# Patient Record
Sex: FEMALE | Race: OTHER | NOT HISPANIC OR LATINO | URBAN - METROPOLITAN AREA
[De-identification: names, ages, dates, MRNs, and addresses within clinical notes are randomized per-mention and may not be internally consistent; named-entity substitution may affect disease eponyms.]

---

## 2021-09-22 ENCOUNTER — INPATIENT (INPATIENT)
Facility: HOSPITAL | Age: 66
LOS: 2 days | Discharge: HOME | DRG: 286 | End: 2021-09-25
Attending: HOSPITALIST | Admitting: HOSPITALIST
Payer: MEDICARE

## 2021-09-22 VITALS
HEIGHT: 66 IN | WEIGHT: 193.12 LBS | SYSTOLIC BLOOD PRESSURE: 173 MMHG | TEMPERATURE: 99 F | RESPIRATION RATE: 16 BRPM | OXYGEN SATURATION: 96 % | DIASTOLIC BLOOD PRESSURE: 86 MMHG | HEART RATE: 68 BPM

## 2021-09-22 LAB
ALBUMIN SERPL ELPH-MCNC: 4.2 G/DL — SIGNIFICANT CHANGE UP (ref 3.3–5)
ALP SERPL-CCNC: 238 U/L — HIGH (ref 40–120)
ALT FLD-CCNC: 11 U/L — SIGNIFICANT CHANGE UP (ref 10–45)
ANION GAP SERPL CALC-SCNC: 13 MMOL/L — SIGNIFICANT CHANGE UP (ref 5–17)
AST SERPL-CCNC: 24 U/L — SIGNIFICANT CHANGE UP (ref 10–40)
BASOPHILS # BLD AUTO: 0.04 K/UL — SIGNIFICANT CHANGE UP (ref 0–0.2)
BASOPHILS NFR BLD AUTO: 0.6 % — SIGNIFICANT CHANGE UP (ref 0–2)
BILIRUB SERPL-MCNC: 0.4 MG/DL — SIGNIFICANT CHANGE UP (ref 0.2–1.2)
BUN SERPL-MCNC: 17 MG/DL — SIGNIFICANT CHANGE UP (ref 7–23)
CALCIUM SERPL-MCNC: 9.4 MG/DL — SIGNIFICANT CHANGE UP (ref 8.4–10.5)
CHLORIDE SERPL-SCNC: 98 MMOL/L — SIGNIFICANT CHANGE UP (ref 96–108)
CK MB CFR SERPL CALC: 1.4 NG/ML — SIGNIFICANT CHANGE UP (ref 0–6.7)
CK SERPL-CCNC: 54 U/L — SIGNIFICANT CHANGE UP (ref 25–170)
CO2 SERPL-SCNC: 28 MMOL/L — SIGNIFICANT CHANGE UP (ref 22–31)
CREAT SERPL-MCNC: 4.04 MG/DL — HIGH (ref 0.5–1.3)
EOSINOPHIL # BLD AUTO: 0.16 K/UL — SIGNIFICANT CHANGE UP (ref 0–0.5)
EOSINOPHIL NFR BLD AUTO: 2.4 % — SIGNIFICANT CHANGE UP (ref 0–6)
GLUCOSE SERPL-MCNC: 91 MG/DL — SIGNIFICANT CHANGE UP (ref 70–99)
HCT VFR BLD CALC: 30.7 % — LOW (ref 34.5–45)
HGB BLD-MCNC: 10 G/DL — LOW (ref 11.5–15.5)
IMM GRANULOCYTES NFR BLD AUTO: 0.4 % — SIGNIFICANT CHANGE UP (ref 0–1.5)
LIDOCAIN IGE QN: 41 U/L — SIGNIFICANT CHANGE UP (ref 7–60)
LYMPHOCYTES # BLD AUTO: 1.26 K/UL — SIGNIFICANT CHANGE UP (ref 1–3.3)
LYMPHOCYTES # BLD AUTO: 18.9 % — SIGNIFICANT CHANGE UP (ref 13–44)
MAGNESIUM SERPL-MCNC: 1.9 MG/DL — SIGNIFICANT CHANGE UP (ref 1.6–2.6)
MCHC RBC-ENTMCNC: 30 PG — SIGNIFICANT CHANGE UP (ref 27–34)
MCHC RBC-ENTMCNC: 32.6 GM/DL — SIGNIFICANT CHANGE UP (ref 32–36)
MCV RBC AUTO: 92.2 FL — SIGNIFICANT CHANGE UP (ref 80–100)
MONOCYTES # BLD AUTO: 0.71 K/UL — SIGNIFICANT CHANGE UP (ref 0–0.9)
MONOCYTES NFR BLD AUTO: 10.6 % — SIGNIFICANT CHANGE UP (ref 2–14)
NEUTROPHILS # BLD AUTO: 4.48 K/UL — SIGNIFICANT CHANGE UP (ref 1.8–7.4)
NEUTROPHILS NFR BLD AUTO: 67.1 % — SIGNIFICANT CHANGE UP (ref 43–77)
NRBC # BLD: 0 /100 WBCS — SIGNIFICANT CHANGE UP (ref 0–0)
NT-PROBNP SERPL-SCNC: HIGH PG/ML (ref 0–300)
PHOSPHATE SERPL-MCNC: 3 MG/DL — SIGNIFICANT CHANGE UP (ref 2.5–4.5)
PLATELET # BLD AUTO: 177 K/UL — SIGNIFICANT CHANGE UP (ref 150–400)
POTASSIUM SERPL-MCNC: 3.5 MMOL/L — SIGNIFICANT CHANGE UP (ref 3.5–5.3)
POTASSIUM SERPL-SCNC: 3.5 MMOL/L — SIGNIFICANT CHANGE UP (ref 3.5–5.3)
PROT SERPL-MCNC: 7.8 G/DL — SIGNIFICANT CHANGE UP (ref 6–8.3)
RBC # BLD: 3.33 M/UL — LOW (ref 3.8–5.2)
RBC # FLD: 18 % — HIGH (ref 10.3–14.5)
SARS-COV-2 RNA SPEC QL NAA+PROBE: NEGATIVE — SIGNIFICANT CHANGE UP
SODIUM SERPL-SCNC: 139 MMOL/L — SIGNIFICANT CHANGE UP (ref 135–145)
TROPONIN T SERPL-MCNC: 0.08 NG/ML — CRITICAL HIGH (ref 0–0.01)
WBC # BLD: 6.68 K/UL — SIGNIFICANT CHANGE UP (ref 3.8–10.5)
WBC # FLD AUTO: 6.68 K/UL — SIGNIFICANT CHANGE UP (ref 3.8–10.5)

## 2021-09-22 PROCEDURE — 71046 X-RAY EXAM CHEST 2 VIEWS: CPT | Mod: 26

## 2021-09-22 PROCEDURE — 76705 ECHO EXAM OF ABDOMEN: CPT | Mod: 26

## 2021-09-22 PROCEDURE — 99223 1ST HOSP IP/OBS HIGH 75: CPT

## 2021-09-22 PROCEDURE — 93010 ELECTROCARDIOGRAM REPORT: CPT

## 2021-09-22 PROCEDURE — 99285 EMERGENCY DEPT VISIT HI MDM: CPT

## 2021-09-22 RX ORDER — MORPHINE SULFATE 50 MG/1
2 CAPSULE, EXTENDED RELEASE ORAL ONCE
Refills: 0 | Status: DISCONTINUED | OUTPATIENT
Start: 2021-09-22 | End: 2021-09-22

## 2021-09-22 RX ADMIN — MORPHINE SULFATE 2 MILLIGRAM(S): 50 CAPSULE, EXTENDED RELEASE ORAL at 23:37

## 2021-09-22 NOTE — ED ADULT NURSE NOTE - OBJECTIVE STATEMENT
66y female c/o intermittent R sided CP since Monday. pt sent in from dialysis center after receiving 3 of 3hr 50min session today. CP described as pressure and radiating to R side of back. hx of HTN, HLD, gout. pt gets dialysis on MWF. Patient is awake and alert. Alert and oriented x 4. speaking in clear, full sentences. Respirations even and unlabored. pt denies fever, chills, n/v/d, CP, headache, dizziness, numbness, tingling, blurry vision.

## 2021-09-22 NOTE — ED PROVIDER NOTE - CLINICAL SUMMARY MEDICAL DECISION MAKING FREE TEXT BOX
episodic pain right scapular region to chest over past week, today relieved by oxygen.  hx HTN ESRD, last provocative testing years ago.  IV contrast allergy. HD stable, pain-free at time of exam.  pain not reproducible.  EKG with T wave abnl, fairly similar to 2013 episodic pain right scapular region to chest over past week, today relieved by oxygen.  hx HTN ESRD, last provocative testing years ago.  IV contrast allergy. HD stable, pain-free at time of exam.  pain not reproducible.  EKG with T wave abnl, fairly similar to 2013.  No abd pain; not worse post-prandial.  Mild alk phos elevation, will get RUQ US to ensure not biliary colic.

## 2021-09-22 NOTE — ED PROVIDER NOTE - NS ED ROS FT
CONSTITUTIONAL: No fever, chills, or weakness  NEURO: No headache, no dizziness, no syncope; No focal weakness/tingling/numbness  EYES: No visual changes  ENT: No rhinorrhea or sore throat  PULM: No cough or dyspnea  CV: HPI  GI: No abdominal pain, vomiting, or diarrhea  : No dysuria, hematuria, frequency  MSK: No neck pain or back pain.   Gets sore knees, chronic.  No lower ext edema  SKIN: no rash or unusual bruising

## 2021-09-22 NOTE — ED ADULT NURSE REASSESSMENT NOTE - NS ED NURSE REASSESS COMMENT FT1
pt back from US. BP noted to be elevated. VS updated as per flow sheet. pt complaining of CP. BUFFY Sapp made aware. 2mg morphine ordered.

## 2021-09-22 NOTE — ED PROVIDER NOTE - OBJECTIVE STATEMENT
65 yo fem pMH HTN, ESRD HD MWF, gout  c/o pain in right scapular region radiating right anterior chest; intermittent episodes over past week, more frequent at night.  Described "like gas" pain.  Not affected by exertion, movement, position, or breathing. No SOB or palpitations, no cough.  No ESCOBAR.  The pain has been keeping her up at night for hours, cannot get comfortable. Today had the pain during dialysis, and her session was terminated a little early due to the pain.  She was given oxygen and the pain resolved a few minutes later.  No hx of CAD/CHF or diabetes.  Last stress test apx 10 yrs ago, unknown result.  Family hx mother had angina, onset ~50s.  Pt smokes cigarettes.  Used cocaine many years ago. 67 yo fem pMH HTN, ESRD HD MWF, gout  c/o pain in right scapular region radiating right anterior chest; intermittent episodes over past week, more frequent at night.  Described "like gas" pain.  Not affected by exertion, movement, position, or breathing. No SOB or palpitations, no cough.  No ESCOBAR.  The pain has been keeping her up at night for hours, cannot get comfortable. Today had the pain during dialysis, and her session was terminated a little early due to the pain.  She was given oxygen and the pain resolved a few minutes later.  EMS transported to ED, pain-free at time of exam.     No hx of CAD/CHF or diabetes.  Last stress test apx 10 yrs ago, unknown result.    Family hx mother had angina, onset ~50s.    Pt smokes cigarettes.  Used cocaine many years ago.  Vaccinated for Covid.

## 2021-09-22 NOTE — ED PROVIDER NOTE - PHYSICAL EXAMINATION
CONSTITUTIONAL: NAD   SKIN: Normal color and turgor.    HEAD: NC/AT.  EYES: Conjunctiva clear. EOMI. PERRL.    ENT: Airway clear. Normal voice.   RESPIRATORY:  Normal work of breathing. Lungs essentially clear, trace crackles at bases bilat. + JVD.  CARDIOVASCULAR:  RRR, S1S2. No M/R/G.  Good thrill left AC.   GI:  Abdomen soft, nontender.    MSK: Neck supple.  No lower extremity edema or calf tenderness.  No joint swelling or ROM limitation.  NEURO: Alert and oriented; CN II-XII grossly intact. Speech clear. 5/5 strength in all extremities.  Good balance. Steady gait.

## 2021-09-22 NOTE — ED ADULT TRIAGE NOTE - CHIEF COMPLAINT QUOTE
Pt reports intermittent chest pain since Monday, reports no pain at this time. Pt gets dialysis MWF, had dialysis on Monday, and completed 3 hours of 3 hours and 50min today. Pt denies sob, n/v, dizziness, weakness, cough, fevers.

## 2021-09-22 NOTE — ED ADULT NURSE REASSESSMENT NOTE - NS ED NURSE REASSESS COMMENT FT1
as per BUFFY Sapp, if US is negative pt will be admitted to cards. bed ready on 5lach. waiting for US to result. pt currently @ US. will return to ED when finished.

## 2021-09-23 DIAGNOSIS — D64.9 ANEMIA, UNSPECIFIED: ICD-10-CM

## 2021-09-23 DIAGNOSIS — I10 ESSENTIAL (PRIMARY) HYPERTENSION: ICD-10-CM

## 2021-09-23 DIAGNOSIS — R74.8 ABNORMAL LEVELS OF OTHER SERUM ENZYMES: ICD-10-CM

## 2021-09-23 DIAGNOSIS — Z29.9 ENCOUNTER FOR PROPHYLACTIC MEASURES, UNSPECIFIED: ICD-10-CM

## 2021-09-23 DIAGNOSIS — I24.9 ACUTE ISCHEMIC HEART DISEASE, UNSPECIFIED: ICD-10-CM

## 2021-09-23 DIAGNOSIS — R07.9 CHEST PAIN, UNSPECIFIED: ICD-10-CM

## 2021-09-23 DIAGNOSIS — N18.6 END STAGE RENAL DISEASE: ICD-10-CM

## 2021-09-23 LAB
A1C WITH ESTIMATED AVERAGE GLUCOSE RESULT: 4.4 % — SIGNIFICANT CHANGE UP (ref 4–5.6)
ANION GAP SERPL CALC-SCNC: 14 MMOL/L — SIGNIFICANT CHANGE UP (ref 5–17)
APTT BLD: 33.7 SEC — SIGNIFICANT CHANGE UP (ref 27.5–35.5)
BUN SERPL-MCNC: 24 MG/DL — HIGH (ref 7–23)
CALCIUM SERPL-MCNC: 9.8 MG/DL — SIGNIFICANT CHANGE UP (ref 8.4–10.5)
CHLORIDE SERPL-SCNC: 98 MMOL/L — SIGNIFICANT CHANGE UP (ref 96–108)
CHOLEST SERPL-MCNC: 212 MG/DL — HIGH
CK MB CFR SERPL CALC: 1.2 NG/ML — SIGNIFICANT CHANGE UP (ref 0–6.7)
CK MB CFR SERPL CALC: 1.3 NG/ML — SIGNIFICANT CHANGE UP (ref 0–6.7)
CK SERPL-CCNC: 41 U/L — SIGNIFICANT CHANGE UP (ref 25–170)
CK SERPL-CCNC: 49 U/L — SIGNIFICANT CHANGE UP (ref 25–170)
CO2 SERPL-SCNC: 28 MMOL/L — SIGNIFICANT CHANGE UP (ref 22–31)
COVID-19 SPIKE DOMAIN AB INTERP: POSITIVE
COVID-19 SPIKE DOMAIN ANTIBODY RESULT: >250 U/ML — HIGH
CREAT SERPL-MCNC: 6.05 MG/DL — HIGH (ref 0.5–1.3)
ESTIMATED AVERAGE GLUCOSE: 80 MG/DL — SIGNIFICANT CHANGE UP (ref 68–114)
FERRITIN SERPL-MCNC: 1475 NG/ML — HIGH (ref 15–150)
FOLATE SERPL-MCNC: >20 NG/ML — SIGNIFICANT CHANGE UP
GGT SERPL-CCNC: 12 U/L — SIGNIFICANT CHANGE UP (ref 8–40)
GLUCOSE SERPL-MCNC: 83 MG/DL — SIGNIFICANT CHANGE UP (ref 70–99)
HBV SURFACE AB SER-ACNC: REACTIVE
HBV SURFACE AG SER-ACNC: SIGNIFICANT CHANGE UP
HCT VFR BLD CALC: 29.1 % — LOW (ref 34.5–45)
HCV AB S/CO SERPL IA: 0.05 S/CO — SIGNIFICANT CHANGE UP
HCV AB SERPL-IMP: SIGNIFICANT CHANGE UP
HDLC SERPL-MCNC: 81 MG/DL — SIGNIFICANT CHANGE UP
HGB BLD-MCNC: 9.3 G/DL — LOW (ref 11.5–15.5)
IRON SATN MFR SERPL: 41 % — SIGNIFICANT CHANGE UP (ref 14–50)
IRON SATN MFR SERPL: 57 UG/DL — SIGNIFICANT CHANGE UP (ref 30–160)
LIPID PNL WITH DIRECT LDL SERPL: 107 MG/DL — HIGH
MAGNESIUM SERPL-MCNC: 1.8 MG/DL — SIGNIFICANT CHANGE UP (ref 1.6–2.6)
MCHC RBC-ENTMCNC: 29.9 PG — SIGNIFICANT CHANGE UP (ref 27–34)
MCHC RBC-ENTMCNC: 32 GM/DL — SIGNIFICANT CHANGE UP (ref 32–36)
MCV RBC AUTO: 93.6 FL — SIGNIFICANT CHANGE UP (ref 80–100)
NON HDL CHOLESTEROL: 131 MG/DL — HIGH
NRBC # BLD: 0 /100 WBCS — SIGNIFICANT CHANGE UP (ref 0–0)
PLATELET # BLD AUTO: 171 K/UL — SIGNIFICANT CHANGE UP (ref 150–400)
POTASSIUM SERPL-MCNC: 3.7 MMOL/L — SIGNIFICANT CHANGE UP (ref 3.5–5.3)
POTASSIUM SERPL-SCNC: 3.7 MMOL/L — SIGNIFICANT CHANGE UP (ref 3.5–5.3)
RBC # BLD: 3.11 M/UL — LOW (ref 3.8–5.2)
RBC # FLD: 18 % — HIGH (ref 10.3–14.5)
SARS-COV-2 IGG+IGM SERPL QL IA: >250 U/ML — HIGH
SARS-COV-2 IGG+IGM SERPL QL IA: POSITIVE
SODIUM SERPL-SCNC: 140 MMOL/L — SIGNIFICANT CHANGE UP (ref 135–145)
TIBC SERPL-MCNC: 140 UG/DL — LOW (ref 220–430)
TRANSFERRIN SERPL-MCNC: 126 MG/DL — LOW (ref 200–360)
TRIGL SERPL-MCNC: 120 MG/DL — SIGNIFICANT CHANGE UP
TROPONIN T SERPL-MCNC: 0.08 NG/ML — CRITICAL HIGH (ref 0–0.01)
TROPONIN T SERPL-MCNC: 0.08 NG/ML — CRITICAL HIGH (ref 0–0.01)
UIBC SERPL-MCNC: 83 UG/DL — LOW (ref 110–370)
VIT B12 SERPL-MCNC: 686 PG/ML — SIGNIFICANT CHANGE UP (ref 232–1245)
WBC # BLD: 5.7 K/UL — SIGNIFICANT CHANGE UP (ref 3.8–10.5)
WBC # FLD AUTO: 5.7 K/UL — SIGNIFICANT CHANGE UP (ref 3.8–10.5)

## 2021-09-23 PROCEDURE — 93306 TTE W/DOPPLER COMPLETE: CPT | Mod: 26

## 2021-09-23 PROCEDURE — 78452 HT MUSCLE IMAGE SPECT MULT: CPT | Mod: 26

## 2021-09-23 PROCEDURE — 99232 SBSQ HOSP IP/OBS MODERATE 35: CPT | Mod: GC

## 2021-09-23 PROCEDURE — 99233 SBSQ HOSP IP/OBS HIGH 50: CPT

## 2021-09-23 PROCEDURE — 93018 CV STRESS TEST I&R ONLY: CPT

## 2021-09-23 PROCEDURE — 93016 CV STRESS TEST SUPVJ ONLY: CPT

## 2021-09-23 RX ORDER — AMLODIPINE BESYLATE 2.5 MG/1
2.5 TABLET ORAL EVERY 24 HOURS
Refills: 0 | Status: DISCONTINUED | OUTPATIENT
Start: 2021-09-23 | End: 2021-09-23

## 2021-09-23 RX ORDER — CINACALCET 30 MG/1
1 TABLET, FILM COATED ORAL
Qty: 0 | Refills: 0 | DISCHARGE

## 2021-09-23 RX ORDER — SEVELAMER CARBONATE 2400 MG/1
800 POWDER, FOR SUSPENSION ORAL
Refills: 0 | Status: DISCONTINUED | OUTPATIENT
Start: 2021-09-23 | End: 2021-09-25

## 2021-09-23 RX ORDER — SUCROFERRIC OXYHYDROXIDE 500 MG/1
1 TABLET, CHEWABLE ORAL
Qty: 0 | Refills: 0 | DISCHARGE

## 2021-09-23 RX ORDER — CINACALCET 30 MG/1
30 TABLET, FILM COATED ORAL DAILY
Refills: 0 | Status: DISCONTINUED | OUTPATIENT
Start: 2021-09-23 | End: 2021-09-25

## 2021-09-23 RX ORDER — AMLODIPINE BESYLATE 2.5 MG/1
5 TABLET ORAL EVERY 24 HOURS
Refills: 0 | Status: DISCONTINUED | OUTPATIENT
Start: 2021-09-23 | End: 2021-09-23

## 2021-09-23 RX ORDER — AMLODIPINE BESYLATE 2.5 MG/1
10 TABLET ORAL DAILY
Refills: 0 | Status: DISCONTINUED | OUTPATIENT
Start: 2021-09-23 | End: 2021-09-24

## 2021-09-23 RX ORDER — HEPARIN SODIUM 5000 [USP'U]/ML
5000 INJECTION INTRAVENOUS; SUBCUTANEOUS EVERY 8 HOURS
Refills: 0 | Status: DISCONTINUED | OUTPATIENT
Start: 2021-09-23 | End: 2021-09-25

## 2021-09-23 RX ORDER — CARVEDILOL PHOSPHATE 80 MG/1
25 CAPSULE, EXTENDED RELEASE ORAL EVERY 12 HOURS
Refills: 0 | Status: DISCONTINUED | OUTPATIENT
Start: 2021-09-23 | End: 2021-09-25

## 2021-09-23 RX ORDER — SEVELAMER CARBONATE 2400 MG/1
1 POWDER, FOR SUSPENSION ORAL
Qty: 0 | Refills: 0 | DISCHARGE

## 2021-09-23 RX ORDER — ETELCALCETIDE 5 MG/ML
2.5 INJECTION, SOLUTION INTRAVENOUS
Qty: 0 | Refills: 0 | DISCHARGE

## 2021-09-23 RX ORDER — ASPIRIN/CALCIUM CARB/MAGNESIUM 324 MG
81 TABLET ORAL DAILY
Refills: 0 | Status: DISCONTINUED | OUTPATIENT
Start: 2021-09-24 | End: 2021-09-25

## 2021-09-23 RX ORDER — CARVEDILOL PHOSPHATE 80 MG/1
25 CAPSULE, EXTENDED RELEASE ORAL ONCE
Refills: 0 | Status: COMPLETED | OUTPATIENT
Start: 2021-09-23 | End: 2021-09-23

## 2021-09-23 RX ORDER — ALLOPURINOL 300 MG
100 TABLET ORAL EVERY 24 HOURS
Refills: 0 | Status: DISCONTINUED | OUTPATIENT
Start: 2021-09-23 | End: 2021-09-25

## 2021-09-23 RX ORDER — ACETAMINOPHEN 500 MG
650 TABLET ORAL EVERY 6 HOURS
Refills: 0 | Status: DISCONTINUED | OUTPATIENT
Start: 2021-09-23 | End: 2021-09-25

## 2021-09-23 RX ORDER — INFLUENZA VIRUS VACCINE 15; 15; 15; 15 UG/.5ML; UG/.5ML; UG/.5ML; UG/.5ML
0.5 SUSPENSION INTRAMUSCULAR ONCE
Refills: 0 | Status: DISCONTINUED | OUTPATIENT
Start: 2021-09-23 | End: 2021-09-23

## 2021-09-23 RX ORDER — ATORVASTATIN CALCIUM 80 MG/1
40 TABLET, FILM COATED ORAL AT BEDTIME
Refills: 0 | Status: DISCONTINUED | OUTPATIENT
Start: 2021-09-23 | End: 2021-09-25

## 2021-09-23 RX ORDER — ASPIRIN/CALCIUM CARB/MAGNESIUM 324 MG
81 TABLET ORAL ONCE
Refills: 0 | Status: COMPLETED | OUTPATIENT
Start: 2021-09-23 | End: 2021-09-23

## 2021-09-23 RX ORDER — AMLODIPINE BESYLATE 2.5 MG/1
7.5 TABLET ORAL ONCE
Refills: 0 | Status: COMPLETED | OUTPATIENT
Start: 2021-09-23 | End: 2021-09-23

## 2021-09-23 RX ORDER — INFLUENZA VIRUS VACCINE 15; 15; 15; 15 UG/.5ML; UG/.5ML; UG/.5ML; UG/.5ML
0.7 SUSPENSION INTRAMUSCULAR ONCE
Refills: 0 | Status: COMPLETED | OUTPATIENT
Start: 2021-09-23 | End: 2021-09-25

## 2021-09-23 RX ORDER — HYDRALAZINE HCL 50 MG
10 TABLET ORAL ONCE
Refills: 0 | Status: COMPLETED | OUTPATIENT
Start: 2021-09-23 | End: 2021-09-23

## 2021-09-23 RX ADMIN — SEVELAMER CARBONATE 800 MILLIGRAM(S): 2400 POWDER, FOR SUSPENSION ORAL at 18:04

## 2021-09-23 RX ADMIN — Medication 10 MILLIGRAM(S): at 11:13

## 2021-09-23 RX ADMIN — AMLODIPINE BESYLATE 7.5 MILLIGRAM(S): 2.5 TABLET ORAL at 11:13

## 2021-09-23 RX ADMIN — CINACALCET 30 MILLIGRAM(S): 30 TABLET, FILM COATED ORAL at 18:04

## 2021-09-23 RX ADMIN — CARVEDILOL PHOSPHATE 25 MILLIGRAM(S): 80 CAPSULE, EXTENDED RELEASE ORAL at 00:50

## 2021-09-23 RX ADMIN — CARVEDILOL PHOSPHATE 25 MILLIGRAM(S): 80 CAPSULE, EXTENDED RELEASE ORAL at 21:01

## 2021-09-23 RX ADMIN — AMLODIPINE BESYLATE 2.5 MILLIGRAM(S): 2.5 TABLET ORAL at 05:37

## 2021-09-23 RX ADMIN — CARVEDILOL PHOSPHATE 25 MILLIGRAM(S): 80 CAPSULE, EXTENDED RELEASE ORAL at 08:32

## 2021-09-23 RX ADMIN — Medication 1 TABLET(S): at 18:04

## 2021-09-23 RX ADMIN — HEPARIN SODIUM 5000 UNIT(S): 5000 INJECTION INTRAVENOUS; SUBCUTANEOUS at 05:37

## 2021-09-23 RX ADMIN — MORPHINE SULFATE 2 MILLIGRAM(S): 50 CAPSULE, EXTENDED RELEASE ORAL at 00:52

## 2021-09-23 RX ADMIN — Medication 81 MILLIGRAM(S): at 11:13

## 2021-09-23 RX ADMIN — Medication 100 MILLIGRAM(S): at 05:39

## 2021-09-23 RX ADMIN — ATORVASTATIN CALCIUM 40 MILLIGRAM(S): 80 TABLET, FILM COATED ORAL at 21:02

## 2021-09-23 NOTE — PROGRESS NOTE ADULT - ASSESSMENT
65 yo F with past medical history of HTN, ESRD HD MWF, gout presents from dialysis clinic complaining of chest pain. Found to have elevated trop and BNP, admitted to cardiac telemetry for ischemic work up and r/o ACS

## 2021-09-23 NOTE — PROGRESS NOTE ADULT - PROBLEM SELECTOR PLAN 2
History of hypertension. Presents with elevated blood pressure after dialysis. SBP up to 210s on arrival to floor on admission, likely in setting of missed medication dose, asymptomatic. Patient states that she has a long history of hypertension, managed by her nephrologist Dr. Arellano.  - Patient states home blood pressures remain elevated 150-160s on dialysis days and 170s-180s on non dialysis days. Will aim for 24 hour goal back to 160s systolic blood pressure  - c/w home med carvedilol 25mg q12h  - c/w home med amlodipine 2.5mg  - Consider uptitration of amlodipine  - Collateral medical history from Dr. Arellano on blood pressure regimen History of hypertension. Patient presented to ED with SBP up to 210s on arrival to floor on admission, likely in setting of missed medication dose, asymptomatic. SBP in 200s 9/23 AM --Patient states home blood pressures remain elevated 150-160s on dialysis days and 170s-180s on non dialysis days.   - c/w home med carvedilol 25mg q12h  - Increase home med amlodipine 2.5 to 10mg  - Collateral medical history from Dr. Arellano on blood pressure regimen -known history of hypertension, follows with nephrologist Dr. Arellano  -home medications: amlodipine 2.5mg qd, coreg 25mg bid  -Patient presented to ED with SBP up to 210s on arrival to floor on admission, likely in setting of missed medication dose, asymptomatic. SBP in 200s 9/23 AM --Patient states home blood pressures remain elevated 150-160s on dialysis days and 170s-180s on non dialysis days.   Plan:    -c/w home med carvedilol 25mg q12h    -Increase home med amlodipine 2.5 to 10mg qd    -Collateral medical history from Dr. Arellano on blood pressure regimen

## 2021-09-23 NOTE — H&P ADULT - PROBLEM SELECTOR PLAN 6
F: None  E: Repletion per renal, pt on dialysis  N: NPO at midnight, DASH    DVT PPx: Subq hep 5000 U q8h  GI PPx: none    Dispo: Cardiac Telemetry

## 2021-09-23 NOTE — PROGRESS NOTE ADULT - SUBJECTIVE AND OBJECTIVE BOX
Patient was seen and evaluated on dialysis. patients BPs are better controlled than this AM; will adjust time to 3 hours instead of 4 hours    Dialyzer: Optiflux [id number]  QB: 400 mL/min  QD: 500 mL/min  K bath: 3  Goal UF: 3L  Duration: 180 min    Patient is tolerating the procedure well.   Continue full hemodialysis treatment as prescribed. Patient was seen and evaluated on dialysis. patients BPs are still elevated patient tolerated stress test    Dialyzer: Optiflux [id number]  QB: 400 mL/min  QD: 500 mL/min  K bath: 3  Goal UF: 3L  Duration: 240 min    Patient is tolerating the procedure well.   Continue full hemodialysis treatment as prescribed. Patient was seen and evaluated on dialysis. patients BPs are still elevated patient tolerated stress test however improving;     Dialyzer: Optiflux [id number]  QB: 400 mL/min  QD: 500 mL/min  K bath: 3  Goal UF: 3L  Duration: 240 min    Patient is tolerating the procedure well.   Continue full hemodialysis treatment as prescribed.

## 2021-09-23 NOTE — H&P ADULT - PROBLEM SELECTOR PLAN 2
History of hypertension. Presents with elevated blood pressure after dialysis. Possibly 2/2 pain.  - c/w home medications History of hypertension. Presents with elevated blood pressure after dialysis. SBP up to 210s on arrival to floor on admission, likely in setting of missed medication dose, asymptomatic. Patient states that she has a long history of hypertension, managed by her nephrologist Dr. Arellano.  - Patient states home blood pressures remain elevated 150-160s on dialysis days and 170s-180s on non dialysis days  - c/w home med carvedilol 25mg q12h  - c/w home med amlodipine 2.5mg  - Consider uptitration of amlodipine  - Collateral medical history from Dr. Arellano on blood pressure regimen History of hypertension. Presents with elevated blood pressure after dialysis. SBP up to 210s on arrival to floor on admission, likely in setting of missed medication dose, asymptomatic. Patient states that she has a long history of hypertension, managed by her nephrologist Dr. Arellano.  - Patient states home blood pressures remain elevated 150-160s on dialysis days and 170s-180s on non dialysis days. Will aim for 24 hour goal back to 160s systolic blood pressure  - c/w home med carvedilol 25mg q12h  - c/w home med amlodipine 2.5mg  - Consider uptitration of amlodipine  - Collateral medical history from Dr. Arellano on blood pressure regimen

## 2021-09-23 NOTE — PROGRESS NOTE ADULT - PROBLEM SELECTOR PLAN 5
Hgb 10.0 (unknown baseline) likely 2/2 chronic disease given MCV 92. Has received venofer and EPO outpatient and Mircera  - f/u iron studies and ferritin in AM  - f/u folate and B12 Hgb 10.0 (unknown baseline) likely 2/2 chronic disease given ferritin 1475, TBIC 140, folate and B12 WNL. Has received Venofer and EPO outpatient and Mircera  Plan:  - Consider starting iron supplementation

## 2021-09-23 NOTE — H&P ADULT - HISTORY OF PRESENT ILLNESS
65 yo F with past medical history of HTN, ESRD HD MWF, gout presents from dialysis clinic complaining of right shoulder pain. Patient notes that the pain is in right shoulder region and radiates in the front as well when she has it. It has been on and off for the past week, intermittent episodes, more frequent at night. Described pain as "like gas pain", but more in her shoulder not epigastric, does eat late meals after 7pm, denies metallic taste. Not affected by exertion, movement, position, or breathing. No SOB or palpitations, no cough. No ESCOBAR. The pain has been keeping her up at night for hours, cannot get comfortable. Today had the pain during dialysis, and her session was terminated a little early due to the pain. She was given oxygen and the pain resolved a few minutes later. No hx of CAD/CHF or diabetes. Last stress test apx 10 yrs ago, unknown result. Family hx mother had angina, onset ~50s. Pt smokes cigarettes.  Used cocaine many years ago. Vaccinated for Covid.    ED Vitals: T 98.7, P 68, /86, RR 16, O2 96% on RA  ED Course:  - Labs: Hgb 10.0, Cr 4.04, Alk Phos 238, Trop 0.08, CKMB 1.4, sBNP >70,000  - Imaging: CXR with no acute cardiopulmonary disease process. Cardiomegaly.  - EKG prelim read: Sinus, normal axis, 1st degree heart block (), no ST elevations or depressions, T wave flattening in V5-V6, occasional PVC  - Interventions: Morphine 2mg

## 2021-09-23 NOTE — PROGRESS NOTE ADULT - SUBJECTIVE AND OBJECTIVE BOX
***Note in progress***    OVERNIGHT EVENTS: NAEO    SUBJECTIVE / INTERVAL HPI: Patient seen and examined at bedside. Patient denying chest pain, SOB, palpitations, cough. Patient denies fever, chills, HA, Dizziness, change in vision/hearing, N/V, abdominal pain, diarrhea, constipation, hematochezia/melena, dysuria, hematuria, new onset weakness/numbness, LE pain and/or swelling.    Remaining ROS negative       PHYSICAL EXAM:    General: WDWN  HEENT: NC/AT; PERRL, anicteric sclera; MMM  Neck: supple  Cardiovascular: +S1/S2, RRR  Respiratory: CTA B/L; no W/R/R  Gastrointestinal: soft, NT/ND; +BSx4  Extremities: WWP; no edema, clubbing or cyanosis  Vascular: 2+ radial, DP/PT pulses B/L  Neurological: AAOx3; no focal deficits  Psychiatric: pleasant mood and affect  Dermatologic: no appreciable wounds or damage to the skin    VITAL SIGNS:  Vital Signs Last 24 Hrs  T(C): 36.7 (23 Sep 2021 00:36), Max: 37.1 (22 Sep 2021 18:43)  T(F): 98 (23 Sep 2021 00:36), Max: 98.7 (22 Sep 2021 18:43)  HR: 75 (23 Sep 2021 04:00) (68 - 81)  BP: 186/76 (23 Sep 2021 04:00) (173/86 - 219/93)  BP(mean): 109 (23 Sep 2021 04:00) (109 - 143)  RR: 18 (23 Sep 2021 04:00) (16 - 20)  SpO2: 100% (23 Sep 2021 04:00) (96% - 100%)      MEDICATIONS:  MEDICATIONS  (STANDING):  allopurinol 100 milliGRAM(s) Oral every 24 hours  amLODIPine   Tablet 2.5 milliGRAM(s) Oral every 24 hours  carvedilol 25 milliGRAM(s) Oral every 12 hours  cinacalcet 30 milliGRAM(s) Oral daily  heparin   Injectable 5000 Unit(s) SubCutaneous every 8 hours  influenza  Vaccine (HIGH DOSE) 0.7 milliLiter(s) IntraMuscular once  Nephro-margaret 1 Tablet(s) Oral daily  sevelamer carbonate 800 milliGRAM(s) Oral three times a day with meals    MEDICATIONS  (PRN):  acetaminophen   Tablet .. 650 milliGRAM(s) Oral every 6 hours PRN Moderate Pain (4 - 6)      ALLERGIES:  Allergies    IV Contrast (Angioedema)  lisinopril (Angioedema)  penicillin (Unknown)    Intolerances        LABS:                        9.3    5.70  )-----------( 171      ( 23 Sep 2021 06:48 )             29.1     09-22    139  |  98  |  17  ----------------------------<  91  3.5   |  28  |  4.04<H>    Ca    9.4      22 Sep 2021 20:04  Phos  3.0     09-22  Mg     1.9     09-22    TPro  7.8  /  Alb  4.2  /  TBili  0.4  /  DBili  x   /  AST  24  /  ALT  11  /  AlkPhos  238<H>  09-22    PTT - ( 23 Sep 2021 06:48 )  PTT:33.7 sec    CAPILLARY BLOOD GLUCOSE          RADIOLOGY & ADDITIONAL TESTS: Reviewed. ***Note in progress***    OVERNIGHT EVENTS: NAEO    SUBJECTIVE / INTERVAL HPI: Patient seen and examined at bedside. Patient denying chest pain, SOB, palpitations, cough. Patient denies fever, chills, HA, Dizziness, change in vision/hearing, N/V, abdominal pain, diarrhea, constipation, hematochezia/melena, dysuria, hematuria, new onset weakness/numbness, LE pain and/or swelling.    Remaining ROS negative       PHYSICAL EXAM:    General: Pt resting comfortably in bed, in no acute distress  HEENT: NC/AT; PERRL, anicteric sclera; MMM  Neck: supple, no JVD presnt  Cardiovascular: +S1/S2, normal RRR, no murmurs gallops or rubs  Respiratory: CTA B/L; no W/R/R, no use of accessory muscles  Gastrointestinal: soft, NT/ND, no palpable masses or hepatosplenomegaly appreciated; no rebound or guarding, +BSx4  Extremities: WWP; no edema, clubbing or cyanosis  Vascular: 2+ radial, DP/PT pulses B/L  Neurological: AAOx3; no focal deficits  Psychiatric: pleasant mood and affect  Dermatologic: no appreciable wounds or damage to the skin    VITAL SIGNS:  Vital Signs Last 24 Hrs  T(C): 36.7 (23 Sep 2021 00:36), Max: 37.1 (22 Sep 2021 18:43)  T(F): 98 (23 Sep 2021 00:36), Max: 98.7 (22 Sep 2021 18:43)  HR: 75 (23 Sep 2021 04:00) (68 - 81)  BP: 186/76 (23 Sep 2021 04:00) (173/86 - 219/93)  BP(mean): 109 (23 Sep 2021 04:00) (109 - 143)  RR: 18 (23 Sep 2021 04:00) (16 - 20)  SpO2: 100% (23 Sep 2021 04:00) (96% - 100%)      MEDICATIONS:  MEDICATIONS  (STANDING):  allopurinol 100 milliGRAM(s) Oral every 24 hours  amLODIPine   Tablet 2.5 milliGRAM(s) Oral every 24 hours  carvedilol 25 milliGRAM(s) Oral every 12 hours  cinacalcet 30 milliGRAM(s) Oral daily  heparin   Injectable 5000 Unit(s) SubCutaneous every 8 hours  influenza  Vaccine (HIGH DOSE) 0.7 milliLiter(s) IntraMuscular once  Nephro-margaret 1 Tablet(s) Oral daily  sevelamer carbonate 800 milliGRAM(s) Oral three times a day with meals    MEDICATIONS  (PRN):  acetaminophen   Tablet .. 650 milliGRAM(s) Oral every 6 hours PRN Moderate Pain (4 - 6)      ALLERGIES:  Allergies    IV Contrast (Angioedema)  lisinopril (Angioedema)  penicillin (Unknown)    Intolerances        LABS:                        9.3    5.70  )-----------( 171      ( 23 Sep 2021 06:48 )             29.1     09-22    139  |  98  |  17  ----------------------------<  91  3.5   |  28  |  4.04<H>    Ca    9.4      22 Sep 2021 20:04  Phos  3.0     09-22  Mg     1.9     09-22    TPro  7.8  /  Alb  4.2  /  TBili  0.4  /  DBili  x   /  AST  24  /  ALT  11  /  AlkPhos  238<H>  09-22    PTT - ( 23 Sep 2021 06:48 )  PTT:33.7 sec    CAPILLARY BLOOD GLUCOSE          RADIOLOGY & ADDITIONAL TESTS: Reviewed.   OVERNIGHT EVENTS: NAEO    SUBJECTIVE / INTERVAL HPI: Patient seen and examined at bedside. Pt has no complaints at current. Patient denying chest pain, SOB, palpitations, cough. Patient denies fever, chills, HA, Dizziness, change in vision/hearing, N/V, abdominal pain, diarrhea, constipation, hematochezia/melena, dysuria, hematuria, new onset weakness/numbness, LE pain and/or swelling.    Remaining ROS negative       PHYSICAL EXAM:    General: Pt resting comfortably in bed, in no acute distress  HEENT: NC/AT; PERRL, anicteric sclera; MMM  Neck: supple, no JVD presnt  Cardiovascular: +S1/S2, normal RRR, no murmurs gallops or rubs  Respiratory: CTA B/L; no W/R/R, no use of accessory muscles  Gastrointestinal: soft, NT/ND, no palpable masses or hepatosplenomegaly appreciated; no rebound or guarding, +BSx4  Extremities: WWP; no edema, clubbing or cyanosis  Vascular: 2+ radial, DP/PT pulses B/L  Neurological: AAOx3; no focal deficits  Psychiatric: pleasant mood and affect  Dermatologic: no appreciable wounds or damage to the skin    VITAL SIGNS:  Vital Signs Last 24 Hrs  T(C): 36.7 (23 Sep 2021 00:36), Max: 37.1 (22 Sep 2021 18:43)  T(F): 98 (23 Sep 2021 00:36), Max: 98.7 (22 Sep 2021 18:43)  HR: 75 (23 Sep 2021 04:00) (68 - 81)  BP: 186/76 (23 Sep 2021 04:00) (173/86 - 219/93)  BP(mean): 109 (23 Sep 2021 04:00) (109 - 143)  RR: 18 (23 Sep 2021 04:00) (16 - 20)  SpO2: 100% (23 Sep 2021 04:00) (96% - 100%)      MEDICATIONS:  MEDICATIONS  (STANDING):  allopurinol 100 milliGRAM(s) Oral every 24 hours  amLODIPine   Tablet 2.5 milliGRAM(s) Oral every 24 hours  carvedilol 25 milliGRAM(s) Oral every 12 hours  cinacalcet 30 milliGRAM(s) Oral daily  heparin   Injectable 5000 Unit(s) SubCutaneous every 8 hours  influenza  Vaccine (HIGH DOSE) 0.7 milliLiter(s) IntraMuscular once  Nephro-margaret 1 Tablet(s) Oral daily  sevelamer carbonate 800 milliGRAM(s) Oral three times a day with meals    MEDICATIONS  (PRN):  acetaminophen   Tablet .. 650 milliGRAM(s) Oral every 6 hours PRN Moderate Pain (4 - 6)      ALLERGIES:  Allergies    IV Contrast (Angioedema)  lisinopril (Angioedema)  penicillin (Unknown)    Intolerances        LABS:                        9.3    5.70  )-----------( 171      ( 23 Sep 2021 06:48 )             29.1     09-22    139  |  98  |  17  ----------------------------<  91  3.5   |  28  |  4.04<H>    Ca    9.4      22 Sep 2021 20:04  Phos  3.0     09-22  Mg     1.9     09-22    TPro  7.8  /  Alb  4.2  /  TBili  0.4  /  DBili  x   /  AST  24  /  ALT  11  /  AlkPhos  238<H>  09-22    PTT - ( 23 Sep 2021 06:48 )  PTT:33.7 sec    CAPILLARY BLOOD GLUCOSE          RADIOLOGY & ADDITIONAL TESTS: Reviewed.

## 2021-09-23 NOTE — PROGRESS NOTE ADULT - PROBLEM SELECTOR PLAN 4
Elevated alk phos to 238. Patient states that it feels like gas pain  - f/u ED RUQ read  - f/u serum GGT  - Consider simethicone PRN Elevated alk phos to 238 on admission, GGT found to be WNL. Patient states that it feels like gas pain, however has no active pain 9/23 AM  RUQ US 9/22: 1. No acute sonographic findings; 5 mm non-obstructing right midpole intrarenal calculus.  Plan:  - Consider simethicone PRN

## 2021-09-23 NOTE — H&P ADULT - NSHPLABSRESULTS_GEN_ALL_CORE
.  LABS:                         10.0   6.68  )-----------( 177      ( 22 Sep 2021 20:04 )             30.7     09-22    139  |  98  |  17  ----------------------------<  91  3.5   |  28  |  4.04<H>    Ca    9.4      22 Sep 2021 20:04  Phos  3.0     09-22  Mg     1.9     09-22    TPro  7.8  /  Alb  4.2  /  TBili  0.4  /  DBili  x   /  AST  24  /  ALT  11  /  AlkPhos  238<H>  09-22        CARDIAC MARKERS ( 22 Sep 2021 20:04 )  x     / 0.08 ng/mL / 54 U/L / x     / 1.4 ng/mL      Serum Pro-Brain Natriuretic Peptide: >42906 pg/mL (09-22 @ 20:04)        RADIOLOGY, EKG & ADDITIONAL TESTS: Reviewed.

## 2021-09-23 NOTE — H&P ADULT - PROBLEM SELECTOR PLAN 5
Hgb 10.0 (unknown baseline) likely 2/2 chronic disease given MCV 92. Has received venofer and EPO outpatient and Mircera  - f/u iron studies and ferritin in AM  - f/u folate and B12

## 2021-09-23 NOTE — PROGRESS NOTE ADULT - PROBLEM SELECTOR PLAN 1
# r/o ACS  Presenting with right shoulder and right anterior chest pain. Intermittent, non reproducible. "relieved by oxygen" and morphine. Patient states it feels similar to epigastric "gas pain" she has had in the past. Currently no chest pain and asymptomatic.   EKG non ischemic on prelim read  Trop elevated to 0.08 on admission, possibly in setting of ESRD  - Cardiac telemetry monitoring for possible ischemic work up  - Trend cardiac enzymes to peak  - f/u TTE  - NPO at midnight for ischemic work up  - GERD work up as below # r/o ACS  Presented with right shoulder and right anterior chest pain. Intermittent, non reproducible. "relieved by oxygen" and morphine. Currently no chest pain and asymptomatic.   EKG non ischemic on prelim read  Trop elevated to 0.08 on admission, likely in the setting of ESRD  - TTE 9/23: Severe concentric left ventricular hypertrophy. The left ventricle is normal in size and systolic function with a calculated ejection fraction of 65-70%. There are no regional wall motion abnormalities seen.  - F/U NST  - Start aspirin 81mg and plavix 75mg daily pending results of NST # r/o ACS  Presented with right shoulder and right anterior chest pain. Intermittent, non reproducible. "relieved by oxygen" and morphine. Currently no chest pain and asymptomatic.   -EKG non ischemic on prelim read  -Trop elevated to 0.08 on admission, likely in the setting of ESRD. Troponin trended, has remained stable at 0.08, likely 2/2 poor clearance from ESRD.  - TTE 9/23: Severe concentric left ventricular hypertrophy. The left ventricle is normal in size and systolic function with a calculated ejection fraction of 65-70%. There are no regional wall motion abnormalities seen.  Plan:    - F/U pharmacologic nuclear stress test (patient cannot participate in exercise stress due to arthritis)    - Start aspirin 81mg and plavix 75mg daily pending results of NST # r/o ACS  Presented with right shoulder and right anterior chest pain. Intermittent, non reproducible. "relieved by oxygen" and morphine. Currently no chest pain and asymptomatic.   -EKG non ischemic on prelim read  -Trop elevated to 0.08 on admission, likely in the setting of ESRD. Troponin trended, has remained stable at 0.08, likely 2/2 poor clearance from ESRD.  - TTE 9/23: Severe concentric left ventricular hypertrophy. The left ventricle is normal in size and systolic function with a calculated ejection fraction of 65-70%. There are no regional wall motion abnormalities seen.  Plan:    - F/U pharmacologic nuclear stress test (patient cannot participate in exercise stress due to arthritis)  - make pt NPO at midnight in case a procedure is needed pending NST results    - Start aspirin 81mg and plavix 75mg daily pending results of NST

## 2021-09-23 NOTE — H&P ADULT - ASSESSMENT
67 yo F with past medical history of HTN, ESRD HD MWF, gout presents from dialysis clinic complaining of chest pain. Found to have elevated trop and BNP, admitted to cardiac telemetry for ischemic work up and r/o ACS

## 2021-09-23 NOTE — H&P ADULT - NSHPPHYSICALEXAM_GEN_ALL_CORE
.  VITAL SIGNS:  T(C): 36.8 (09-22-21 @ 23:26), Max: 37.1 (09-22-21 @ 18:43)  T(F): 98.2 (09-22-21 @ 23:26), Max: 98.7 (09-22-21 @ 18:43)  HR: 81 (09-22-21 @ 23:26) (68 - 81)  BP: 197/85 (09-22-21 @ 23:26) (173/86 - 197/85)  BP(mean): --  RR: 20 (09-22-21 @ 23:26) (16 - 20)  SpO2: 99% (09-22-21 @ 23:26) (96% - 99%)  Wt(kg): --    PHYSICAL EXAM:  General: NAD, answering questions, pleasantly conversant  HEENT: NC/AT, PERRL, EOMI, anicteric sclera, MMM  Neck: supple; no JVD or thyromegaly  Respiratory: CTA B/L; no W/R/R, no accessory muscle use  Cardiac: +S1/S2; RRR; no M/R/G  Gastrointestinal: soft, NT/ND; no rebound or guarding; +BSx4  Extremities: WWP, no clubbing or cyanosis; no peripheral edema  Vascular: 2+ radial, femoral, DP/PT pulses B/L  Dermatologic: skin warm, dry and intact; no rashes, wounds, or scars  Neurologic: AAOx3 .  VITAL SIGNS:  T(C): 36.8 (09-22-21 @ 23:26), Max: 37.1 (09-22-21 @ 18:43)  T(F): 98.2 (09-22-21 @ 23:26), Max: 98.7 (09-22-21 @ 18:43)  HR: 81 (09-22-21 @ 23:26) (68 - 81)  BP: 197/85 (09-22-21 @ 23:26) (173/86 - 197/85)  BP(mean): --  RR: 20 (09-22-21 @ 23:26) (16 - 20)  SpO2: 99% (09-22-21 @ 23:26) (96% - 99%)  Wt(kg): --    PHYSICAL EXAM:  General: NAD, answering questions, pleasantly conversant  HEENT: NC/AT, PERRL, EOMI, anicteric sclera, MMM  Neck: supple; no JVD or thyromegaly  Respiratory: CTA B/L; no W/R/R, no accessory muscle use  Cardiac: +S1/S2; RRR; no M/R/G  Gastrointestinal: soft, NT/ND; no rebound or guarding; +BSx4  Extremities: WWP, no clubbing or cyanosis; no peripheral edema  Vascular: 2+ radial, femoral, DP/PT pulses B/L, left AVF thrill  Dermatologic: skin warm, dry and intact; no rashes, wounds, or scars, skin tag on central back  Neurologic: AAOx3 .  VITAL SIGNS:  T(C): 36.8 (09-22-21 @ 23:26), Max: 37.1 (09-22-21 @ 18:43)  T(F): 98.2 (09-22-21 @ 23:26), Max: 98.7 (09-22-21 @ 18:43)  HR: 81 (09-22-21 @ 23:26) (68 - 81)  BP: 197/85 (09-22-21 @ 23:26) (173/86 - 197/85)  BP(mean): --  RR: 20 (09-22-21 @ 23:26) (16 - 20)  SpO2: 99% (09-22-21 @ 23:26) (96% - 99%)  Wt(kg): --    PHYSICAL EXAM:  General: NAD, answering questions, pleasantly conversant  HEENT: NC/AT, PERRL, EOMI, anicteric sclera, MMM  Neck: supple; no JVD or thyromegaly  Respiratory: CTA B/L; no W/R/R, no accessory muscle use  Cardiac: +S1/S2; RRR; no M/R/G  Gastrointestinal: soft, NT/ND; no rebound or guarding; +BSx4  Extremities: WWP, no clubbing or cyanosis; no peripheral edema  Vascular: 2+ radial, femoral, DP/PT pulses B/L, left AVF thrill +  Dermatologic: skin warm, dry and intact; no rashes, wounds, or scars, skin tag on central back  Neurologic: AAOx3

## 2021-09-23 NOTE — H&P ADULT - PROBLEM SELECTOR PLAN 1
# r/o ACS  Presenting with right shoulder and right anterior chest pain. Intermittent, non reproducible. "relieved by oxygen" and morphine. Currently no chest pain.  EKG non ischemic on prelim read  Trop elevated to 0.08 on admission, possibly in setting of ESRD  - Cardiac telemetry monitoring for possible ischemic work up  - Trend cardiac enzymes  - f/u TTE  - NPO at midnight for ischemic work up # r/o ACS  Presenting with right shoulder and right anterior chest pain. Intermittent, non reproducible. "relieved by oxygen" and morphine. Patient states it feels similar to epigastric "gas pain" she has had in the past. Currently no chest pain and asymptomatic.   EKG non ischemic on prelim read  Trop elevated to 0.08 on admission, possibly in setting of ESRD  - Cardiac telemetry monitoring for possible ischemic work up  - Trend cardiac enzymes to peak  - f/u TTE  - NPO at midnight for ischemic work up  - GERD work up as below

## 2021-09-23 NOTE — H&P ADULT - PROBLEM SELECTOR PLAN 4
Elevated alk phos to 238.  - f/u ED RUQ read  - f/u serum GGT Elevated alk phos to 238. Patient states that it feels like gas pain  - f/u ED RUQ read  - f/u serum GGT  - Consider simethicone PRN

## 2021-09-23 NOTE — PROGRESS NOTE ADULT - PROBLEM SELECTOR PLAN 3
Dialysis MWF.   Last session 9/23/21, completed 3 of 4 hours before developing chest pain and terminating session.  - Dialysis per renal if inpatient Dialysis MWF.   Last session 9/23/21, completed 3 of 4 hours before developing chest pain and terminating session. No signs of fluid overload on physical exam  - Renal consulted - dialysis planned after patient completes ACS workup Dialysis MWF. Follows with Dr. Arellano.  Last session 9/23/21, completed 3 of 4 hours before developing chest pain and terminating session. No signs of fluid overload on physical exam  - Renal consulted - dialysis planned after patient completes ACS workup Dialysis MWF. Follows with Dr. Arellano.  Last session 9/23/21, completed 3 of 4 hours before developing chest pain and terminating session. No signs of fluid overload on physical exam  Plan:  - Renal consulted - dialysis planned after patient completes ACS workup  - Will need MWF HD reinstated for outpatient after D/C

## 2021-09-23 NOTE — PATIENT PROFILE ADULT - NSTOBACCOCESSATIONEDU2_GEN_A_NUR
OCONNELL cognition/abnormal muscle tone/decreased ROM/decreased strength cognition/abnormal muscle tone/decreased ROM/decreased strength impaired balance/cognition/abnormal muscle tone/impaired postural control/decreased ROM/decreased strength impaired balance/impaired coordination/decreased flexibility/impaired postural control/decreased ROM/decreased strength Develop coping skills.  For example, strategies and lifestyle changes that reduce negative moods, stress, and exposure to smoking cues.

## 2021-09-23 NOTE — H&P ADULT - PROBLEM SELECTOR PLAN 3
Dialysis MWF.   Last session 9/23/21, completed 3/3.5 hours before developing chest pain and terminating session.  - Dialysis per renal if inpatient Dialysis MWF.   Last session 9/23/21, completed 3 of 4 hours before developing chest pain and terminating session.  - Dialysis per renal if inpatient

## 2021-09-24 ENCOUNTER — TRANSCRIPTION ENCOUNTER (OUTPATIENT)
Age: 66
End: 2021-09-24

## 2021-09-24 DIAGNOSIS — I25.10 ATHEROSCLEROTIC HEART DISEASE OF NATIVE CORONARY ARTERY WITHOUT ANGINA PECTORIS: ICD-10-CM

## 2021-09-24 DIAGNOSIS — R07.9 CHEST PAIN, UNSPECIFIED: ICD-10-CM

## 2021-09-24 LAB
ALBUMIN SERPL ELPH-MCNC: 4.3 G/DL — SIGNIFICANT CHANGE UP (ref 3.3–5)
ALP SERPL-CCNC: 216 U/L — HIGH (ref 40–120)
ALT FLD-CCNC: 8 U/L — LOW (ref 10–45)
ANION GAP SERPL CALC-SCNC: 14 MMOL/L — SIGNIFICANT CHANGE UP (ref 5–17)
APTT BLD: 33.8 SEC — SIGNIFICANT CHANGE UP (ref 27.5–35.5)
APTT BLD: 37.8 SEC — HIGH (ref 27.5–35.5)
AST SERPL-CCNC: 15 U/L — SIGNIFICANT CHANGE UP (ref 10–40)
BILIRUB SERPL-MCNC: 0.4 MG/DL — SIGNIFICANT CHANGE UP (ref 0.2–1.2)
BLD GP AB SCN SERPL QL: NEGATIVE — SIGNIFICANT CHANGE UP
BLD GP AB SCN SERPL QL: NEGATIVE — SIGNIFICANT CHANGE UP
BUN SERPL-MCNC: 22 MG/DL — SIGNIFICANT CHANGE UP (ref 7–23)
CALCIUM SERPL-MCNC: 9.9 MG/DL — SIGNIFICANT CHANGE UP (ref 8.4–10.5)
CHLORIDE SERPL-SCNC: 97 MMOL/L — SIGNIFICANT CHANGE UP (ref 96–108)
CO2 SERPL-SCNC: 27 MMOL/L — SIGNIFICANT CHANGE UP (ref 22–31)
CREAT SERPL-MCNC: 5 MG/DL — HIGH (ref 0.5–1.3)
GLUCOSE SERPL-MCNC: 78 MG/DL — SIGNIFICANT CHANGE UP (ref 70–99)
HCT VFR BLD CALC: 31.5 % — LOW (ref 34.5–45)
HGB BLD-MCNC: 10 G/DL — LOW (ref 11.5–15.5)
INR BLD: 1.05 — SIGNIFICANT CHANGE UP (ref 0.88–1.16)
INR BLD: 1.06 — SIGNIFICANT CHANGE UP (ref 0.88–1.16)
MAGNESIUM SERPL-MCNC: 1.9 MG/DL — SIGNIFICANT CHANGE UP (ref 1.6–2.6)
MCHC RBC-ENTMCNC: 29.4 PG — SIGNIFICANT CHANGE UP (ref 27–34)
MCHC RBC-ENTMCNC: 31.7 GM/DL — LOW (ref 32–36)
MCV RBC AUTO: 92.6 FL — SIGNIFICANT CHANGE UP (ref 80–100)
NRBC # BLD: 0 /100 WBCS — SIGNIFICANT CHANGE UP (ref 0–0)
PHOSPHATE SERPL-MCNC: 4.2 MG/DL — SIGNIFICANT CHANGE UP (ref 2.5–4.5)
PLATELET # BLD AUTO: 196 K/UL — SIGNIFICANT CHANGE UP (ref 150–400)
POTASSIUM SERPL-MCNC: 3.6 MMOL/L — SIGNIFICANT CHANGE UP (ref 3.5–5.3)
POTASSIUM SERPL-SCNC: 3.6 MMOL/L — SIGNIFICANT CHANGE UP (ref 3.5–5.3)
PROT SERPL-MCNC: 7.7 G/DL — SIGNIFICANT CHANGE UP (ref 6–8.3)
PROTHROM AB SERPL-ACNC: 12.6 SEC — SIGNIFICANT CHANGE UP (ref 10.6–13.6)
PROTHROM AB SERPL-ACNC: 12.7 SEC — SIGNIFICANT CHANGE UP (ref 10.6–13.6)
RBC # BLD: 3.4 M/UL — LOW (ref 3.8–5.2)
RBC # FLD: 18.1 % — HIGH (ref 10.3–14.5)
RH IG SCN BLD-IMP: POSITIVE — SIGNIFICANT CHANGE UP
RH IG SCN BLD-IMP: POSITIVE — SIGNIFICANT CHANGE UP
SODIUM SERPL-SCNC: 138 MMOL/L — SIGNIFICANT CHANGE UP (ref 135–145)
WBC # BLD: 5.45 K/UL — SIGNIFICANT CHANGE UP (ref 3.8–10.5)
WBC # FLD AUTO: 5.45 K/UL — SIGNIFICANT CHANGE UP (ref 3.8–10.5)

## 2021-09-24 PROCEDURE — G0278: CPT

## 2021-09-24 PROCEDURE — 99232 SBSQ HOSP IP/OBS MODERATE 35: CPT | Mod: GC

## 2021-09-24 PROCEDURE — 99233 SBSQ HOSP IP/OBS HIGH 50: CPT

## 2021-09-24 PROCEDURE — 93458 L HRT ARTERY/VENTRICLE ANGIO: CPT | Mod: 26

## 2021-09-24 PROCEDURE — 99152 MOD SED SAME PHYS/QHP 5/>YRS: CPT

## 2021-09-24 RX ORDER — HYDROCORTISONE 20 MG
200 TABLET ORAL ONCE
Refills: 0 | Status: COMPLETED | OUTPATIENT
Start: 2021-09-24 | End: 2021-09-24

## 2021-09-24 RX ORDER — CLOPIDOGREL BISULFATE 75 MG/1
600 TABLET, FILM COATED ORAL ONCE
Refills: 0 | Status: COMPLETED | OUTPATIENT
Start: 2021-09-24 | End: 2021-09-24

## 2021-09-24 RX ORDER — NIFEDIPINE 30 MG
30 TABLET, EXTENDED RELEASE 24 HR ORAL EVERY 12 HOURS
Refills: 0 | Status: DISCONTINUED | OUTPATIENT
Start: 2021-09-24 | End: 2021-09-25

## 2021-09-24 RX ORDER — DIPHENHYDRAMINE HCL 50 MG
50 CAPSULE ORAL ONCE
Refills: 0 | Status: DISCONTINUED | OUTPATIENT
Start: 2021-09-24 | End: 2021-09-25

## 2021-09-24 RX ADMIN — Medication 81 MILLIGRAM(S): at 11:20

## 2021-09-24 RX ADMIN — CLOPIDOGREL BISULFATE 600 MILLIGRAM(S): 75 TABLET, FILM COATED ORAL at 12:55

## 2021-09-24 RX ADMIN — HEPARIN SODIUM 5000 UNIT(S): 5000 INJECTION INTRAVENOUS; SUBCUTANEOUS at 13:03

## 2021-09-24 RX ADMIN — Medication 200 MILLIGRAM(S): at 16:19

## 2021-09-24 RX ADMIN — Medication 30 MILLIGRAM(S): at 10:44

## 2021-09-24 RX ADMIN — ATORVASTATIN CALCIUM 40 MILLIGRAM(S): 80 TABLET, FILM COATED ORAL at 21:10

## 2021-09-24 RX ADMIN — CARVEDILOL PHOSPHATE 25 MILLIGRAM(S): 80 CAPSULE, EXTENDED RELEASE ORAL at 21:10

## 2021-09-24 RX ADMIN — HEPARIN SODIUM 5000 UNIT(S): 5000 INJECTION INTRAVENOUS; SUBCUTANEOUS at 05:37

## 2021-09-24 RX ADMIN — Medication 100 MILLIGRAM(S): at 05:37

## 2021-09-24 RX ADMIN — CINACALCET 30 MILLIGRAM(S): 30 TABLET, FILM COATED ORAL at 11:21

## 2021-09-24 RX ADMIN — CARVEDILOL PHOSPHATE 25 MILLIGRAM(S): 80 CAPSULE, EXTENDED RELEASE ORAL at 09:07

## 2021-09-24 RX ADMIN — Medication 30 MILLIGRAM(S): at 21:10

## 2021-09-24 RX ADMIN — AMLODIPINE BESYLATE 10 MILLIGRAM(S): 2.5 TABLET ORAL at 05:37

## 2021-09-24 RX ADMIN — Medication 1 TABLET(S): at 11:21

## 2021-09-24 NOTE — PROGRESS NOTE ADULT - PROBLEM SELECTOR PLAN 5
Hgb 10.0 (unknown baseline) likely 2/2 chronic disease given ferritin 1475, TBIC 140, folate and B12 WNL. Has received Venofer and EPO outpatient and Mircera  Plan:  - Consider starting iron supplementation

## 2021-09-24 NOTE — DISCHARGE NOTE PROVIDER - CARE PROVIDERS DIRECT ADDRESSES
,[email or web address]

## 2021-09-24 NOTE — CONSULT NOTE ADULT - ASSESSMENT
Assessment/Plan:   65 yo F with past medical history of HTN, ESRD HD MWF, gout presents with chest pain currently undergoing cardiac work up.     ESRD on HD MWF @[address] dialysis  Plan for cardiac cath, will defer HD today- no urgent indication  HD tomorrow first shift prior to dc  EDW ?68kg  BP: hypertensive, UF with HD; meds changed to coreg 25mg BID, nifedipine 30mg BID  Anaemia- Hgb at goal, no need for IV iron, no epo with HD  BMD: phos at goal on renvela 800 TID    Daily weights, strict I&Os, renally dose meds, renal diet    Thank you for the opportunity to participate in the care of your patient. The nephrology service remains available to assist with any questions or concerns. Please feel free to reach us by paging the on-call nephrology fellow for urgent issues or as below.     Shaila Kramer M.D.   PGY-5  Pager: [phone]

## 2021-09-24 NOTE — PROGRESS NOTE ADULT - PROBLEM SELECTOR PLAN 2
-known history of hypertension, follows with nephrologist Dr. Arellano  -home medications: amlodipine 2.5mg qd, coreg 25mg bid  -Patient presented to ED with SBP up to 210s on arrival to floor on admission, likely in setting of missed medication dose, asymptomatic. SBP in 200s 9/23 AM --Patient states home blood pressures remain elevated 150-160s on dialysis days and 170s-180s on non dialysis days.   Plan:    -c/w home med carvedilol 25mg q12h    -Increase home med amlodipine 2.5 to 10mg qd    -Collateral medical history from Dr. Arellano on blood pressure regimen -known history of hypertension, follows with nephrologist Dr. Arellano  -home medications: amlodipine 2.5mg qd, coreg 25mg bid  -Patient presented to ED with SBP up to 210s on arrival to floor on admission, likely in setting of missed medication dose, asymptomatic. SBP in 200s 9/23 AM --Patient states home blood pressures remain elevated 150-160s on dialysis days and 170s-180s on non dialysis days.   Plan:    - Cont home med carvedilol 25mg q12h    - Discontinue amlodipine 10 mg and transition to nefidipine xl 30 BID

## 2021-09-24 NOTE — PHYSICAL THERAPY INITIAL EVALUATION ADULT - ADDITIONAL COMMENTS
Pt states she lives with family on second floor walk up. Pt has W/C for community amb and RW, pt requires assistance from her daughter to use stairs and mostly independent with ADL's.

## 2021-09-24 NOTE — DISCHARGE NOTE PROVIDER - NSDCMRMEDTOKEN_GEN_ALL_CORE_FT
allopurinol 100 mg oral tablet: 1 tab(s) orally once a day  amLODIPine 2.5 mg oral tablet: 1 tab(s) orally once a day  carvedilol 25 mg oral tablet: 1 tab(s) orally 2 times a day  Parsabiv: 2.5 milligram(s) intravenous  Renvela 800 mg oral tablet: 1 tab(s) orally 3 times a day (with meals)  Sensipar 30 mg oral tablet: 1 tab(s) orally once a day  Velphoro 500 mg oral tablet, chewable: 1 tab(s) orally 2 times a day   allopurinol 100 mg oral tablet: 1 tab(s) orally once a day  aspirin 81 mg oral delayed release tablet: 1 tab(s) orally once a day  atorvastatin 40 mg oral tablet: 1 tab(s) orally once a day (at bedtime)  carvedilol 25 mg oral tablet: 1 tab(s) orally 2 times a day  NIFEdipine 60 mg oral tablet, extended release: 1 tab(s) orally once a day  Parsabiv: 2.5 milligram(s) intravenous  Renvela 800 mg oral tablet: 1 tab(s) orally 3 times a day (with meals)  Sensipar 30 mg oral tablet: 1 tab(s) orally once a day  Velphoro 500 mg oral tablet, chewable: 1 tab(s) orally 2 times a day

## 2021-09-24 NOTE — PROGRESS NOTE ADULT - PROBLEM SELECTOR PLAN 1
# r/o ACS  Presented with right shoulder and right anterior chest pain. Intermittent, non reproducible. "relieved by oxygen" and morphine. Currently no chest pain and asymptomatic.   -EKG non ischemic on prelim read  -Trop elevated to 0.08 on admission, likely in the setting of ESRD. Troponin trended, has remained stable at 0.08, likely 2/2 poor clearance from ESRD.  - TTE 9/23: Severe concentric left ventricular hypertrophy. The left ventricle is normal in size and systolic function with a calculated ejection fraction of 65-70%. There are no regional wall motion abnormalities seen.  Plan:    - F/U pharmacologic nuclear stress test (patient cannot participate in exercise stress due to arthritis)  - make pt NPO at midnight in case a procedure is needed pending NST results    - Start aspirin 81mg and plavix 75mg daily pending results of NST R/O CAD. Pt presented with chest pain, admitted for ACS workup. H/O ESRD on HD, HTN but no known cardiac history.   -EKG non ischemic on prelim read  - TTE 9/23: Severe concentric left ventricular hypertrophy. The left ventricle is normal in size and systolic function with a calculated ejection fraction of 65-70%. There are no regional wall motion abnormalities seen.  - NST 9/23: Myocardial perfusion imaging is abnormal. There is a large area of prior infarct in in the entire inferior, apical and mid anterior walls with no evidence of briseida-infarct ischemia. There is a small area of mild ischemia in the apical septal wall  Plan:    - Pt scheduled for cath on 9/24    - Cont aspirin 81mg and plavix 75mg daily pending results of NST    - Transition amlodipine 10mg to nifidepine xl 30mg BID with coreg 25mg BID

## 2021-09-24 NOTE — PHYSICAL THERAPY INITIAL EVALUATION ADULT - GENERAL OBSERVATIONS, REHAB EVAL
Pt received semi supine in bed +tele +hep lock. PT received consent to treat from CARA Vivas. Pt was left as received with call bell in reach, VSS, and in NAD. PT to follow up.

## 2021-09-24 NOTE — PROGRESS NOTE ADULT - SUBJECTIVE AND OBJECTIVE BOX
***Note in progress***    OVERNIGHT EVENTS: NAEO    SUBJECTIVE / INTERVAL HPI: Patient seen and examined at bedside. Patient denying chest pain, SOB, palpitations, cough. Patient denies fever, chills, HA, Dizziness, change in vision/hearing, N/V, abdominal pain, diarrhea, constipation, hematochezia/melena, dysuria, hematuria, new onset weakness/numbness, LE pain and/or swelling.    Remaining ROS negative       PHYSICAL EXAM:    General: WDWN  HEENT: NC/AT; PERRL, anicteric sclera; MMM  Neck: supple  Cardiovascular: +S1/S2, RRR  Respiratory: CTA B/L; no W/R/R  Gastrointestinal: soft, NT/ND; +BSx4  Extremities: WWP; no edema, clubbing or cyanosis  Vascular: 2+ radial, DP/PT pulses B/L  Neurological: AAOx3; no focal deficits  Psychiatric: pleasant mood and affect  Dermatologic: no appreciable wounds or damage to the skin    VITAL SIGNS:  Vital Signs Last 24 Hrs  T(C): 36.7 (24 Sep 2021 05:29), Max: 36.8 (23 Sep 2021 09:18)  T(F): 98 (24 Sep 2021 05:29), Max: 98.3 (23 Sep 2021 09:18)  HR: 68 (24 Sep 2021 04:54) (65 - 75)  BP: 180/79 (24 Sep 2021 04:54) (150/73 - 206/86)  BP(mean): 113 (24 Sep 2021 04:54) (105 - 157)  RR: 18 (24 Sep 2021 04:54) (16 - 20)  SpO2: 100% (24 Sep 2021 04:54) (99% - 100%)      MEDICATIONS:  MEDICATIONS  (STANDING):  allopurinol 100 milliGRAM(s) Oral every 24 hours  amLODIPine   Tablet 10 milliGRAM(s) Oral daily  aspirin enteric coated 81 milliGRAM(s) Oral daily  atorvastatin 40 milliGRAM(s) Oral at bedtime  carvedilol 25 milliGRAM(s) Oral every 12 hours  cinacalcet 30 milliGRAM(s) Oral daily  heparin   Injectable 5000 Unit(s) SubCutaneous every 8 hours  influenza  Vaccine (HIGH DOSE) 0.7 milliLiter(s) IntraMuscular once  Nephro-margaret 1 Tablet(s) Oral daily  sevelamer carbonate 800 milliGRAM(s) Oral three times a day with meals    MEDICATIONS  (PRN):  acetaminophen   Tablet .. 650 milliGRAM(s) Oral every 6 hours PRN Moderate Pain (4 - 6)      ALLERGIES:  Allergies    IV Contrast (Angioedema)  lisinopril (Angioedema)  penicillin (Unknown)    Intolerances        LABS:                        9.3    5.70  )-----------( 171      ( 23 Sep 2021 06:48 )             29.1     09-23    140  |  98  |  24<H>  ----------------------------<  83  3.7   |  28  |  6.05<H>    Ca    9.8      23 Sep 2021 06:48  Phos  3.0     09-22  Mg     1.8     09-23    TPro  7.8  /  Alb  4.2  /  TBili  0.4  /  DBili  x   /  AST  24  /  ALT  11  /  AlkPhos  238<H>  09-22    PTT - ( 23 Sep 2021 06:48 )  PTT:33.7 sec    CAPILLARY BLOOD GLUCOSE          RADIOLOGY & ADDITIONAL TESTS: Reviewed. ***Note in progress***    OVERNIGHT EVENTS: NAEO    SUBJECTIVE / INTERVAL HPI: Patient seen and examined at bedside. Patient expresses frustration as she is not able to eat this morning pending cath lab. I explained to her the need to remain NPO and gave education on the need to perform the procedure. No symptomatic complaints at this time. Patient denying chest pain, SOB, palpitations, cough. Patient denies fever, chills, HA, Dizziness, change in vision/hearing, N/V, abdominal pain, diarrhea, constipation, hematochezia/melena, dysuria, hematuria, new onset weakness/numbness, LE pain and/or swelling.    Remaining ROS negative       PHYSICAL EXAM:  General: Pt sitting at bedside comfortably, no acute distress  HEENT: NC/AT; PERRL, anicteric sclera; MMM  Neck: supple, no JVD presnt  Cardiovascular: +S1/S2, normal RRR, no murmurs gallops or rubs  Respiratory: CTA B/L; no W/R/R, no use of accessory muscles  Gastrointestinal: soft, NT/ND, no palpable masses or hepatosplenomegaly appreciated; no rebound or guarding, +BSx4  Extremities: WWP; no edema, clubbing or cyanosis  Vascular: 2+ radial, DP/PT pulses B/L  Neurological: AAOx3; no focal deficits  Psychiatric: pleasant mood and affect  Dermatologic: no appreciable wounds or damage to the skin    VITAL SIGNS:  Vital Signs Last 24 Hrs  T(C): 36.7 (24 Sep 2021 05:29), Max: 36.8 (23 Sep 2021 09:18)  T(F): 98 (24 Sep 2021 05:29), Max: 98.3 (23 Sep 2021 09:18)  HR: 68 (24 Sep 2021 04:54) (65 - 75)  BP: 180/79 (24 Sep 2021 04:54) (150/73 - 206/86)  BP(mean): 113 (24 Sep 2021 04:54) (105 - 157)  RR: 18 (24 Sep 2021 04:54) (16 - 20)  SpO2: 100% (24 Sep 2021 04:54) (99% - 100%)      MEDICATIONS:  MEDICATIONS  (STANDING):  allopurinol 100 milliGRAM(s) Oral every 24 hours  amLODIPine   Tablet 10 milliGRAM(s) Oral daily  aspirin enteric coated 81 milliGRAM(s) Oral daily  atorvastatin 40 milliGRAM(s) Oral at bedtime  carvedilol 25 milliGRAM(s) Oral every 12 hours  cinacalcet 30 milliGRAM(s) Oral daily  heparin   Injectable 5000 Unit(s) SubCutaneous every 8 hours  influenza  Vaccine (HIGH DOSE) 0.7 milliLiter(s) IntraMuscular once  Nephro-margaret 1 Tablet(s) Oral daily  sevelamer carbonate 800 milliGRAM(s) Oral three times a day with meals    MEDICATIONS  (PRN):  acetaminophen   Tablet .. 650 milliGRAM(s) Oral every 6 hours PRN Moderate Pain (4 - 6)      ALLERGIES:  Allergies    IV Contrast (Angioedema)  lisinopril (Angioedema)  penicillin (Unknown)    Intolerances        LABS:                        9.3    5.70  )-----------( 171      ( 23 Sep 2021 06:48 )             29.1     09-23    140  |  98  |  24<H>  ----------------------------<  83  3.7   |  28  |  6.05<H>    Ca    9.8      23 Sep 2021 06:48  Phos  3.0     09-22  Mg     1.8     09-23    TPro  7.8  /  Alb  4.2  /  TBili  0.4  /  DBili  x   /  AST  24  /  ALT  11  /  AlkPhos  238<H>  09-22    PTT - ( 23 Sep 2021 06:48 )  PTT:33.7 sec    CAPILLARY BLOOD GLUCOSE          RADIOLOGY & ADDITIONAL TESTS: Reviewed.

## 2021-09-24 NOTE — DISCHARGE NOTE PROVIDER - CARE PROVIDER_API CALL
Hamlet Garza  CARDIOLOGY  [address] [place] [address]  Phone: [phone]  Fax: [phone]  Follow Up Time:    Hamlet Garza  CARDIOLOGY  [address] [place] [address]  Phone: [phone]  Fax: [phone]  Follow Up Time:     Marcelo Arellano  INTERNAL MEDICINE  [address], [address]  [place] [address]  Phone: [phone]  Fax: [phone]  Established Patient  Follow Up Time: 1 week

## 2021-09-24 NOTE — PROGRESS NOTE ADULT - PROBLEM SELECTOR PLAN 3
Dialysis MWF. Follows with Dr. Arellano.  Last session 9/23/21, completed 3 of 4 hours before developing chest pain and terminating session. No signs of fluid overload on physical exam  Plan:  - Renal consulted - dialysis planned after patient completes ACS workup  - Will need MWF HD reinstated for outpatient after D/C Dialysis MWF. Follows with Dr. Arellano.  - HD performed 9/23, 3L removed  Plan:  - Renal following, dialysis planned for 9/24 AM   - Will need MWF HD reinstated for outpatient after D/C

## 2021-09-24 NOTE — PROGRESS NOTE ADULT - SUBJECTIVE AND OBJECTIVE BOX
Interventional Cardiology PA Precath Note    INCOMPLETE,need to see patient.     HPI:    65 y/o F with PMH of HTN, ESRD HD( MWF; last session; 9/22/21 ) , gout presents from dialysis clinic complaining of right shoulder pain. Patient notes that the pain is in right shoulder region and radiates in the front as well when she has it. It has been on and off for the past week, intermittent episodes, more frequent at night. Described pain as "like gas pain", but more in her shoulder not epigastric, does eat late meals after 7pm, denies metallic taste. Not affected by exertion, movement, position, or breathing. No SOB or palpitations, no cough. No ESCOBAR. The pain has been keeping her up at night for hours, cannot get comfortable. Today had the pain during dialysis, and her session was terminated a little early due to the pain. She was given oxygen and the pain resolved a few minutes later. No hx of CAD/CHF or diabetes. Last stress test apx 10 yrs ago, unknown result. Family hx mother had angina, onset ~50s. Pt smokes cigarettes.  Used cocaine many years ago. Vaccinated for Covid. In ED Vitals: T 98.7, P 68, /86, RR 16, O2 96% on RA, ED Course: Labs: Hgb 10.0, Cr 4.04, Alk Phos 238, Trop 0.08, CKMB 1.4, sBNP >70,000 Imaging: CXR with no acute cardiopulmonary disease process. Cardiomegaly. EKG: Sinus, normal axis, 1st degree heart block (), no ST elevations or depressions, T wave flattening in V5-V6, occasional PVC Pt. received Morphine 2mg  IV X 1 in ED and is admitted to cardiology service for further management of chest pain. Trop T 0.08 x 3 likely in the setting of ESRD.   NST 9/23/21 revealed large area of prior infarct in in the entire inferior, apical and mid anterior walls with no evidence of briseida-infarct ischemia. There is a small area of mild ischemia in the apical septal wall. Overall left ventricular systolic function is normal, EF 50 % without regional wall motion abnormalities. There is decreased myocardial thickening in the entire inferior, apical anterior and mid anterior walls. The EKG stress test is normal.    TTE 9/23: Severe concentric left ventricular hypertrophy. The left ventricle is normal in size and systolic function with a calculated ejection fraction of 65-70%. There are no regional wall motion abnormalities seen.  In light of patient’s risk factors, CCS class 4 sx and abnormal NST, pt. is now referred for cardiac cath with possible intervention.   Pt. seen and examined at bedside; pt. denies any ………… chest pain, SOB, dizziness, palpitation, N/V, LE edema, PND/orthopnea.           PMH:  as per HPI   PSH:  as per PI   ALL: IV Contrast (Angioedema)  lisinopril (Angioedema)  penicillin (Unknown)  FHx: as per HPI  Social HX: as per HPI    MEDS:   acetaminophen   Tablet .. 650 milliGRAM(s) Oral every 6 hours PRN  allopurinol 100 milliGRAM(s) Oral every 24 hours  aspirin enteric coated 81 milliGRAM(s) Oral daily  atorvastatin 40 milliGRAM(s) Oral at bedtime  carvedilol 25 milliGRAM(s) Oral every 12 hours  cinacalcet 30 milliGRAM(s) Oral daily  heparin   Injectable 5000 Unit(s) SubCutaneous every 8 hours  influenza  Vaccine (HIGH DOSE) 0.7 milliLiter(s) IntraMuscular once  Nephro-margaret 1 Tablet(s) Oral daily  NIFEdipine XL 30 milliGRAM(s) Oral every 12 hours  sevelamer carbonate 800 milliGRAM(s) Oral three times a day with meals    T(C): 36.7 (09-24-21 @ 05:29), Max: 36.7 (09-23-21 @ 21:05)  HR: 72 (09-24-21 @ 08:09) (65 - 75)  BP: 186/79 (09-24-21 @ 08:09) (150/73 - 186/79)  RR: 21 (09-24-21 @ 08:09) (16 - 21)  SpO2: 100% (09-24-21 @ 08:09) (99% - 100%)  Wt(kg): --                                      10.0   5.45  )-----------( 196      ( 24 Sep 2021 07:35 )             31.5     09-24    138  |  97  |  22  ----------------------------<  78  3.6   |  27  |  5.00<H>    Ca    9.9      24 Sep 2021 08:02  Phos  4.2     09-24  Mg     1.9     09-24    TPro  7.7  /  Alb  4.3  /  TBili  0.4  /  DBili  x   /  AST  15  /  ALT  8<L>  /  AlkPhos  216<H>  09-24    CARDIAC MARKERS ( 23 Sep 2021 06:48 )  x     / 0.08 ng/mL / 41 U/L / x     / 1.2 ng/mL  CARDIAC MARKERS ( 23 Sep 2021 01:28 )  x     / 0.08 ng/mL / 49 U/L / x     / 1.3 ng/mL  CARDIAC MARKERS ( 22 Sep 2021 20:04 )  x     / 0.08 ng/mL / 54 U/L / x     / 1.4 ng/mL          EKG:					  ASA _____				Mallampati class: _________	            Anginal Class: _________  A/P:      65 y/o F with PMH of HTN, ESRD HD( [facility]; last session; 9/22/21 ) , gout is now referred for cardiac cath with possible intervention 2/2 patient’s risk factors, CCS class 4 sx and abnormal NST.     H/H 10/31; anemia likely 2/2 ESRD . Pt denies bleeding, GI bleeding, hematemesis, hematuria, BRBPR or melena . Pt will be  given  ASA 81 mg PO x 1 and Plavix 600 mg PO x 1 pre-cath.  Cr. 5; ESRD; HD( [facility]; last session; 9/22/21 ); primary team to make Renal team aware that pt. to be scheduled for HD session post cath today.   IV contrast dye allergy (rxn: angioedema); will give IV solumedrol 200 X 1 and benadryl 50 mg IV x 1 on call to cath lab.   awaiting PT/PTT/INR results   COVID 9/22/21 negative in [facility].     Risks & benefits of procedure and alternative therapy have been explained to the patient including but not limited to: allergic reaction, bleeding w/possible need for blood transfusion, infection, renal and vascular compromise, limb damage, arrhythmia, stroke, vessel dissection/perforation, Myocardial infarction, emergent CABG. Informed consent obtained and in chart        Sedation Plan:   ? None   ? Moderate    ?  Deep    ?  General Anesthesia   Patient Is Suitable Candidate For Sedation?     ? Yes   ? No   ? Not Applicable     Risks & benefits of procedure and sedation and risks and benefits for the alternative therapy have been explained to the patient in layman’s terms including but not limited to: allergic reaction, bleeding, infection, arrhythmia, respiratory compromise, renal and vascular compromise, limb damage, MI, CVA, emergent CABG/Vascular Surgery and death. Informed consent obtained and in chart. Interventional Cardiology PA Precath Note        HPI:    65 y/o F with PMH of HTN, ESRD HD( MWF; last session; 9/22/21 ) , gout presents from dialysis clinic complaining of right shoulder pain. Patient notes that the pain is in right shoulder region and radiates in the front as well when she has it. It has been on and off for the past week, intermittent episodes, more frequent at night. Described pain as "like gas pain", but more in her shoulder not epigastric, does eat late meals after 7pm, denies metallic taste. Not affected by exertion, movement, position, or breathing. No SOB or palpitations, no cough. No ESCOBAR. The pain has been keeping her up at night for hours, cannot get comfortable. Today had the pain during dialysis, and her session was terminated a little early due to the pain. She was given oxygen and the pain resolved a few minutes later. No hx of CAD/CHF or diabetes. Last stress test apx 10 yrs ago, unknown result. Family hx mother had angina, onset ~50s. Pt smokes cigarettes.  Used cocaine many years ago. Vaccinated for Covid. In ED Vitals: T 98.7, P 68, /86, RR 16, O2 96% on RA, ED Course: Labs: Hgb 10.0, Cr 4.04, Alk Phos 238, Trop 0.08, CKMB 1.4, sBNP >70,000 Imaging: CXR with no acute cardiopulmonary disease process. Cardiomegaly. EKG: Sinus, normal axis, 1st degree heart block (), no ST elevations or depressions, T wave flattening in V5-V6, occasional PVC Pt. received Morphine 2mg  IV X 1 in ED and is admitted to cardiology service for further management of chest pain. Trop T 0.08 x 3 likely in the setting of ESRD.   NST 9/23/21 revealed large area of prior infarct in in the entire inferior, apical and mid anterior walls with no evidence of briseida-infarct ischemia. There is a small area of mild ischemia in the apical septal wall. Overall left ventricular systolic function is normal, EF 50 % without regional wall motion abnormalities. There is decreased myocardial thickening in the entire inferior, apical anterior and mid anterior walls. The EKG stress test is normal.    TTE 9/23: Severe concentric left ventricular hypertrophy. The left ventricle is normal in size and systolic function with a calculated ejection fraction of 65-70%. There are no regional wall motion abnormalities seen.  In light of patient’s risk factors, CCS class 4 sx and abnormal NST, pt. is now referred for cardiac cath with possible intervention.   Pt. seen and examined at bedside; pt. denies any chest pain, SOB, dizziness, palpitation, N/V, LE edema, PND/orthopnea.           PMH:  as per HPI   PSH:  as per PI   ALL: IV Contrast (Angioedema)  lisinopril (Angioedema)  penicillin (Unknown)  FHx: as per HPI  Social HX: as per HPI    MEDS:   acetaminophen   Tablet .. 650 milliGRAM(s) Oral every 6 hours PRN  allopurinol 100 milliGRAM(s) Oral every 24 hours  aspirin enteric coated 81 milliGRAM(s) Oral daily  atorvastatin 40 milliGRAM(s) Oral at bedtime  carvedilol 25 milliGRAM(s) Oral every 12 hours  cinacalcet 30 milliGRAM(s) Oral daily  heparin   Injectable 5000 Unit(s) SubCutaneous every 8 hours  influenza  Vaccine (HIGH DOSE) 0.7 milliLiter(s) IntraMuscular once  Nephro-margaret 1 Tablet(s) Oral daily  NIFEdipine XL 30 milliGRAM(s) Oral every 12 hours  sevelamer carbonate 800 milliGRAM(s) Oral three times a day with meals    T(C): 36.7 (09-24-21 @ 05:29), Max: 36.7 (09-23-21 @ 21:05)  HR: 72 (09-24-21 @ 08:09) (65 - 75)  BP: 186/79 (09-24-21 @ 08:09) (150/73 - 186/79)  RR: 21 (09-24-21 @ 08:09) (16 - 21)  SpO2: 100% (09-24-21 @ 08:09) (99% - 100%)  Wt(kg): --          Gen: NAD  Neck: no JVD  CV: RRR, s1 and s2 positive, n murmurs noted  Pulm: CTA B/L; no w/r/r  GI: soft; NT/ND,   extremities: no clubbing, cyanosis and edema noted b/l, LUE fistula  Neuro: AO x 3; no focal deficits noted  Vasc: groin 2 + B/L with no bruits appreciated; Radial 2 + b/l, dp/pt: doppler b/l                             10.0   5.45  )-----------( 196      ( 24 Sep 2021 07:35 )             31.5     09-24    138  |  97  |  22  ----------------------------<  78  3.6   |  27  |  5.00<H>    Ca    9.9      24 Sep 2021 08:02  Phos  4.2     09-24  Mg     1.9     09-24    TPro  7.7  /  Alb  4.3  /  TBili  0.4  /  DBili  x   /  AST  15  /  ALT  8<L>  /  AlkPhos  216<H>  09-24    CARDIAC MARKERS ( 23 Sep 2021 06:48 )  x     / 0.08 ng/mL / 41 U/L / x     / 1.2 ng/mL  CARDIAC MARKERS ( 23 Sep 2021 01:28 )  x     / 0.08 ng/mL / 49 U/L / x     / 1.3 ng/mL  CARDIAC MARKERS ( 22 Sep 2021 20:04 )  x     / 0.08 ng/mL / 54 U/L / x     / 1.4 ng/mL          EKG:					  ASA _____				Mallampati class: _________	            Anginal Class: _________  A/P:      65 y/o F with PMH of HTN, ESRD HD( [facility]; last session; 9/22/21 ) , gout is now referred for cardiac cath with possible intervention 2/2 patient’s risk factors, CCS class 4 sx and abnormal NST.     H/H 10/31; anemia likely 2/2 ESRD . Pt denies bleeding, GI bleeding, hematemesis, hematuria, BRBPR or melena . Pt will be  given  ASA 81 mg PO x 1 and Plavix 600 mg PO x 1 pre-cath.  Cr. 5; ESRD; HD( [facility]; last session; 9/22/21 ); primary team to make Renal team aware that pt. to be scheduled for HD session post cath today.   IV contrast dye allergy (rxn: angioedema); will give IV solumedrol 200 X 1 and benadryl 50 mg IV x 1 on call to cath lab.   awaiting PT/PTT/INR results   COVID 9/22/21 negative in [facility].     Risks & benefits of procedure and alternative therapy have been explained to the patient including but not limited to: allergic reaction, bleeding w/possible need for blood transfusion, infection, renal and vascular compromise, limb damage, arrhythmia, stroke, vessel dissection/perforation, Myocardial infarction, emergent CABG. Informed consent obtained and in chart        Sedation Plan:   ? None   ? Moderate    ?  Deep    ?  General Anesthesia   Patient Is Suitable Candidate For Sedation?     ? Yes   ? No   ? Not Applicable     Risks & benefits of procedure and sedation and risks and benefits for the alternative therapy have been explained to the patient in layman’s terms including but not limited to: allergic reaction, bleeding, infection, arrhythmia, respiratory compromise, renal and vascular compromise, limb damage, MI, CVA, emergent CABG/Vascular Surgery and death. Informed consent obtained and in chart.

## 2021-09-24 NOTE — PROGRESS NOTE ADULT - PROBLEM SELECTOR PLAN 6
F: None  E: Repletion per renal, pt on dialysis  N: NPO at midnight, DASH    DVT PPx: Subq hep 5000 U q8h  GI PPx: none    Dispo: Cardiac Telemetry
F: None  E: Repletion per renal, pt on dialysis  N: NPO at midnight, DASH    DVT PPx: Subq hep 5000 U q8h  GI PPx: none    Dispo: Cardiac Telemetry

## 2021-09-24 NOTE — CONSULT NOTE ADULT - ATTENDING COMMENTS
I find ESRD, HTN uncontrolled. Cath planned today. A/P: Dialysis comorrow. Continue coreg, nifedipine.

## 2021-09-24 NOTE — DISCHARGE NOTE PROVIDER - NSDCCPCAREPLAN_GEN_ALL_CORE_FT
PRINCIPAL DISCHARGE DIAGNOSIS  Diagnosis: Chest pain  Assessment and Plan of Treatment:       SECONDARY DISCHARGE DIAGNOSES  Diagnosis: Hypertension  Assessment and Plan of Treatment:      PRINCIPAL DISCHARGE DIAGNOSIS  Diagnosis: CAD (coronary artery disease)  Assessment and Plan of Treatment: You came to the hospital with complaints of chest pain. While admitted, multiple exams were performed to evaluate the vessels and anatomy of your heart for any areas of      SECONDARY DISCHARGE DIAGNOSES  Diagnosis: Hypertension  Assessment and Plan of Treatment:     Diagnosis: ESRD on dialysis  Assessment and Plan of Treatment:      PRINCIPAL DISCHARGE DIAGNOSIS  Diagnosis: CAD (coronary artery disease)  Assessment and Plan of Treatment: You came to the hospital with complaints of chest pain. While admitted, multiple exams were performed to evaluate the vessels and anatomy of your heart for any areas of poor blood flow or blockages in your blood vessels. You were found to have areas of the heart that have some increased need for blood due to small vessels and hardening of the hearts arteries by way of heart catheterization. At this time, you did not require any stents to improve blood flow in the heart, however, it is imprtant that you gegin medications to protect the hard from further advancement of disease. Please follow up with Dr. Garza within 7 days of your discahrge. You can make the appointment by calling [phone]. You are being discharged on aspirin 81 mg to be taken daily, and atorvastatin  40 mg daily.      SECONDARY DISCHARGE DIAGNOSES  Diagnosis: Hypertension  Assessment and Plan of Treatment: You came to the hospital with complaints of chest pain. You were found to have very high blood pressures. We have changed your blood pressure medications to better control your pressures. You will discontinue amlodipine 2.5 mg and begin Nifedipine XL 60mg daily and continue carvedilol 25 mg two times a day. Please follwo up with your    Diagnosis: ESRD on dialysis  Assessment and Plan of Treatment: You are on hemodialysis 3 times per week, MW. While admitted, you received dialysis on 9/25 and have been resinstated to continue dialysis on 9/27/2021.     PRINCIPAL DISCHARGE DIAGNOSIS  Diagnosis: CAD (coronary artery disease)  Assessment and Plan of Treatment: You came to the hospital with complaints of chest pain. While admitted, multiple exams were performed to evaluate the vessels and anatomy of your heart for any areas of poor blood flow or blockages in your blood vessels. You were found to have areas of the heart that have some increased need for blood due to small vessels and hardening of the hearts arteries by way of heart catheterization. At this time, you did not require any stents to improve blood flow in the heart, however, it is imprtant that you gegin medications to protect the hard from further advancement of disease. Please follow up with Dr. Garza within 7 days of your discahrge. You can make the appointment by calling [phone]. You are being discharged on aspirin 81 mg to be taken daily, and atorvastatin  40 mg daily.      SECONDARY DISCHARGE DIAGNOSES  Diagnosis: Hypertension  Assessment and Plan of Treatment: You came to the hospital with complaints of chest pain. You were found to have very high blood pressures. We have changed your blood pressure medications to better control your pressures. You will discontinue amlodipine 2.5 mg and begin Nifedipine XL 60mg daily and continue carvedilol 25 mg two times a day. Please follow up with your primary care provider in 1-2 weeks follow ing discharge.    Diagnosis: ESRD on dialysis  Assessment and Plan of Treatment: You are on hemodialysis 3 times per week, MW. While admitted, you received dialysis on 9/25 and have been resinstated to continue dialysis on 9/27/2021. Please Follow up with Dr. Arellano in 1-2 weeks following discharge.

## 2021-09-24 NOTE — CONSULT NOTE ADULT - SUBJECTIVE AND OBJECTIVE BOX
Patient is a 66y old  Female who presents with a chief complaint of chest pain (24 Sep 2021 09:28)    HPI:  65 yo F with past medical history of HTN, ESRD HD MWF, gout presents from dialysis clinic complaining of chest pain currently undergoing cardiac work up. S/p dialysis yesterday for uncontrolled HTN and volume optimisation. BP remains uncontrolled, meds changed to coreg 25 BID and nifedipine 30mg BID today. Plan for cardiac cath, will defer HD today- no urgent indication- pt also doesn't want dialysis.     PAST MEDICAL & SURGICAL HISTORY:  Gout    HTN (hypertension)    ESRD on hemodialysis        Allergies:  IV Contrast (Angioedema)  lisinopril (Angioedema)  penicillin (Unknown)      Home Medications:   acetaminophen   Tablet .. 650 milliGRAM(s) Oral every 6 hours PRN  allopurinol 100 milliGRAM(s) Oral every 24 hours  aspirin enteric coated 81 milliGRAM(s) Oral daily  atorvastatin 40 milliGRAM(s) Oral at bedtime  carvedilol 25 milliGRAM(s) Oral every 12 hours  cinacalcet 30 milliGRAM(s) Oral daily  heparin   Injectable 5000 Unit(s) SubCutaneous every 8 hours  influenza  Vaccine (HIGH DOSE) 0.7 milliLiter(s) IntraMuscular once  Nephro-margaret 1 Tablet(s) Oral daily  NIFEdipine XL 30 milliGRAM(s) Oral every 12 hours  sevelamer carbonate 800 milliGRAM(s) Oral three times a day with meals      Hospital Medications:   MEDICATIONS  (STANDING):  allopurinol 100 milliGRAM(s) Oral every 24 hours  aspirin enteric coated 81 milliGRAM(s) Oral daily  atorvastatin 40 milliGRAM(s) Oral at bedtime  carvedilol 25 milliGRAM(s) Oral every 12 hours  cinacalcet 30 milliGRAM(s) Oral daily  heparin   Injectable 5000 Unit(s) SubCutaneous every 8 hours  influenza  Vaccine (HIGH DOSE) 0.7 milliLiter(s) IntraMuscular once  Nephro-margaret 1 Tablet(s) Oral daily  NIFEdipine XL 30 milliGRAM(s) Oral every 12 hours  sevelamer carbonate 800 milliGRAM(s) Oral three times a day with meals      Family History:  FAMILY HISTORY:      ROS:  GEN: C/o hunger  RESPIRATORY: No shortness of breath  CARDIOVASCULAR: No Chest pain  MUSCULOSKELETAL: No leg swelling    VITALS:  T(F): 98 (09-24-21 @ 05:29), Max: 98.1 (09-23-21 @ 21:05)  HR: 65 (09-24-21 @ 10:00)  BP: 170/77 (09-24-21 @ 10:00)  RR: 17 (09-24-21 @ 10:00)  SpO2: 100% (09-24-21 @ 10:00)  Wt(kg): --    09-23 @ 07:01  -  09-24 @ 07:00  --------------------------------------------------------  IN: 500 mL / OUT: 3650 mL / NET: -3150 mL        CAPILLARY BLOOD GLUCOSE          PHYSICAL EXAM:  GENERAL: Alert, awake, oriented x3  on RA  CHEST/LUNG: Bilateral clear breath sounds, no rales  HEART: Regular rate and rhythm, no murmur  EXTREMITIES: no pedal oedema  ACCESS: LUE AVF w/bruit and thrill     LABS:  09-24    138  |  97  |  22  ----------------------------<  78  3.6   |  27  |  5.00<H>    Ca    9.9      24 Sep 2021 08:02  Phos  4.2     09-24  Mg     1.9     09-24    TPro  7.7  /  Alb  4.3  /  TBili  0.4  /  DBili      /  AST  15  /  ALT  8<L>  /  AlkPhos  216<H>  09-24    Creatinine Trend: 5.00 <--, 6.05 <--, 4.04 <--                        10.0   5.45  )-----------( 196      ( 24 Sep 2021 07:35 )             31.5       Urine Studies:          09-23-21 @ 07:01  -  09-24-21 @ 07:00  --------------------------------------------------------  IN: 500 mL / OUT: 3650 mL / NET: -3150 mL

## 2021-09-24 NOTE — DISCHARGE NOTE PROVIDER - NSDCFUADDAPPT_GEN_ALL_CORE_FT
Please ensure that you call Dr. Garza's office at [phone] to schedule a follow up appointment within 7 days of discharge.     Please ensure that you call to schedule a follow up appointment with Dr. Arellano within 1-2 weeks of your discharge.

## 2021-09-24 NOTE — DISCHARGE NOTE PROVIDER - PROVIDER TOKENS
PROVIDER:[TOKEN:[[id number]]] PROVIDER:[TOKEN:[[id number]]],PROVIDER:[TOKEN:[[id number]],FOLLOWUP:[1 week],ESTABLISHEDPATIENT:[T]]

## 2021-09-24 NOTE — PROGRESS NOTE ADULT - PROBLEM SELECTOR PLAN 4
Elevated alk phos to 238 on admission, GGT found to be WNL. Patient states that it feels like gas pain, however has no active pain 9/23 AM  RUQ US 9/22: 1. No acute sonographic findings; 5 mm non-obstructing right midpole intrarenal calculus.  Plan:  - Consider simethicone PRN Elevated alk phos to 238 on admission, trending down to 216 9/24. GGT found to be WNL. No active GI symptoms of CP.  RUQ US 9/22: 1. No acute sonographic findings; 5 mm non-obstructing right midpole intrarenal calculus.  Plan:  - Consider simethicone PRN  IMPROVING

## 2021-09-24 NOTE — DISCHARGE NOTE PROVIDER - HOSPITAL COURSE
#Discharge: do not delete    Patient is __ yo M/F with past medical history of _____ presented with _____, found to have _____ (one liner)    Hospital course (by problem):     Patient was discharged to: (home/[facility]/acute rehab/hospice, etc, and with what services – home health PT/RN? Home O2?)    New medications:   Changes to old medications:  Medications that were stopped:    Items to follow up as outpatient:    Physical exam at the time of discharge:       #Discharge: do not delete    Patient is __ yo M/F with past medical history of _____ presented with _____, found to have _____ (one liner)    Hospital course (by problem):     Problem/Plan - 1:  ·  Problem: Chest pain.   ·  Plan: R/O CAD. Pt presented with chest pain, admitted for ACS workup. H/O ESRD on HD, HTN but no known cardiac history.   -EKG non ischemic on prelim read  - TTE 9/23: Severe concentric left ventricular hypertrophy. The left ventricle is normal in size and systolic function with a calculated ejection fraction of 65-70%. There are no regional wall motion abnormalities seen.  - NST 9/23: Myocardial perfusion imaging is abnormal. There is a large area of prior infarct in in the entire inferior, apical and mid anterior walls with no evidence of briseida-infarct ischemia. There is a small area of mild ischemia in the apical septal wall  Plan:    - Pt scheduled for cath on 9/24    - Cont aspirin 81mg and plavix 75mg daily pending results of NST    - Transition amlodipine 10mg to nifidepine xl 30mg BID with coreg 25mg BID.     Problem/Plan - 2:  ·  Problem: Hypertension.   ·  Plan: -known history of hypertension, follows with nephrologist Dr. Arellano  -home medications: amlodipine 2.5mg qd, coreg 25mg bid  -Patient presented to ED with SBP up to 210s on arrival to floor on admission, likely in setting of missed medication dose, asymptomatic. SBP in 200s 9/23 AM --Patient states home blood pressures remain elevated 150-160s on dialysis days and 170s-180s on non dialysis days.   Plan:    - Cont home med carvedilol 25mg q12h    - Discontinue amlodipine 10 mg and transition to nefidipine xl 30 BID.     Problem/Plan - 3:  ·  Problem: ESRD on dialysis.   ·  Plan: Dialysis MWF. Follows with Dr. Arellano.  - HD performed 9/23, 3L removed  Plan:  - Renal following, dialysis planned for 9/24 AM   - Will need MWF HD reinstated for outpatient after D/C.     Problem/Plan - 4:  ·  Problem: Elevated alkaline phosphatase level.   ·  Plan: Elevated alk phos to 238 on admission, trending down to 216 9/24. GGT found to be WNL. No active GI symptoms of CP.  RUQ US 9/22: 1. No acute sonographic findings; 5 mm non-obstructing right midpole intrarenal calculus.  Plan:  - Consider simethicone PRN  IMPROVING.     Problem/Plan - 5:  ·  Problem: Anemia.   ·  Plan: Hgb 10.0 (unknown baseline) likely 2/2 chronic disease given ferritin 1475, TBIC 140, folate and B12 WNL. Has received Venofer and EPO outpatient and Mircera  Plan:  - Consider starting iron supplementation.      Patient was discharged to: (home/[facility]/acute rehab/hospice, etc, and with what services – home health PT/RN? 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    Hospital course (by problem):     Chest pain.   ·  Plan: R/O CAD. Pt presented with chest pain, admitted for ACS workup. H/O ESRD on HD, HTN but no known cardiac history.   -EKG non ischemic on prelim read  - TTE 9/23: Severe concentric left ventricular hypertrophy. The left ventricle is normal in size and systolic function with a calculated ejection fraction of 65-70%. There are no regional wall motion abnormalities seen.  - NST 9/23: Myocardial perfusion imaging is abnormal. There is a large area of prior infarct in in the entire inferior, apical and mid anterior walls with no evidence of briseida-infarct ischemia. There is a small area of mild ischemia in the apical septal wall  Plan:    - Pt scheduled for cath on 9/24    - Cont aspirin 81mg and plavix 75mg daily pending results of NST    - Transition amlodipine 10mg to nifidepine xl 30mg BID with coreg 25mg BID.    Hypertension.   ·  Plan: -known history of hypertension, follows with nephrologist Dr. Arellano  -home medications: amlodipine 2.5mg qd, coreg 25mg bid  -Patient presented to ED with SBP up to 210s on arrival to floor on admission, likely in setting of missed medication dose, asymptomatic. SBP in 200s 9/23 AM --Patient states home blood pressures remain elevated 150-160s on dialysis days and 170s-180s on non dialysis days.   Plan:    - Cont home med carvedilol 25mg q12h    - Discontinue amlodipine 10 mg and transition to nefidipine xl 30 BID.    ESRD on dialysis.   ·  Plan: Dialysis MWF. Follows with Dr. Arellano.  - HD performed 9/23, 3L removed  Plan:  - Renal following, dialysis planned for 9/24 AM   - Will need MWF HD reinstated for outpatient after D/C.    Elevated alkaline phosphatase level.   ·  Plan: Elevated alk phos to 238 on admission, trending down to 216 9/24. GGT found to be WNL. No active GI symptoms of CP.  RUQ US 9/22: 1. No acute sonographic findings; 5 mm non-obstructing right midpole intrarenal calculus.  Plan:  - Consider simethicone PRN  IMPROVING.    Anemia.   ·  Plan: Hgb 10.0 (unknown baseline) likely 2/2 chronic disease given ferritin 1475, TBIC 140, folate and B12 WNL. Has received Venofer and EPO outpatient and Mircera  Plan:  - Consider starting iron supplementation.      Patient was discharged to: Home with HPT (home/[facility]/acute rehab/hospice, etc, and with what services – home health PT/RN? Home O2?)    New medications: Nifedipine 30mg BID  Changes to old medications: none  Medications that were stopped: Amlodipine 2.5mg    Items to follow up as outpatient: Greg, Cardiology; Nephro, Arellano    Physical exam at the time of discharge:    General: Pt sitting at bedside comfortably, no acute distress  HEENT: NC/AT; PERRL, anicteric sclera; MMM  Neck: supple, no JVD presnt  Cardiovascular: +S1/S2, normal RRR, no murmurs gallops or rubs  Respiratory: CTA B/L; no W/R/R, no use of accessory muscles  Gastrointestinal: soft, NT/ND, no palpable masses or hepatosplenomegaly appreciated; no rebound or guarding, +BSx4  Extremities: WWP; no edema, clubbing or cyanosis  Vascular: 2+ radial, DP/PT pulses B/L  Neurological: AAOx3; no focal deficits  Psychiatric: pleasant mood and affect  Dermatologic: no appreciable wounds or damage to the skin     #Discharge: do not delete    67 yo F with past medical history of HTN, ESRD HD MWF, gout presents from dialysis clinic complaining of right shoulder pain and chest pain, 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    Hospital course (by problem):     Chest pain.   ·  Plan: R/O CAD. Pt presented with chest pain, admitted for ACS workup. H/O ESRD on HD, HTN but no known cardiac history.   -EKG non ischemic on prelim read  - TTE 9/23: Severe concentric left ventricular hypertrophy. The left ventricle is normal in size and systolic function with a calculated ejection fraction of 65-70%. There are no regional wall motion abnormalities seen.  - NST 9/23: Myocardial perfusion imaging is abnormal. There is a large area of prior infarct in in the entire inferior, apical and mid anterior walls with no evidence of briseida-infarct ischemia. There is a small area of mild ischemia in the apical septal wall  Plan:    - Pt scheduled for cath on 9/24    - Cont aspirin 81mg and plavix 75mg daily pending results of NST    - Transition amlodipine 10mg to nifidepine xl 30mg BID with coreg 25mg BID.  -9/24: [facility] c/w non-obs CAD- cont ASA/statin    Hypertension.   ·  Plan: -known history of hypertension, follows with nephrologist Dr. Arellano  -home medications: amlodipine 2.5mg qd, coreg 25mg bid  -Patient presented to ED with SBP up to 210s on arrival to floor on admission, likely in setting of missed medication dose, asymptomatic. SBP in 200s 9/23 AM --Patient states home blood pressures remain elevated 150-160s on dialysis days and 170s-180s on non dialysis days.   Plan:    - Cont home med carvedilol 25mg q12h    - Discontinue amlodipine 10 mg and transition to nefidipine xl 30 BID.    ESRD on dialysis.   ·  Plan: Dialysis MWF. Follows with Dr. Arellano.  - HD performed 9/23, 3L removed  Plan:  - Renal following, dialysis planned for 9/24 AM   - Will need MWF HD reinstated for outpatient after D/C.    Elevated alkaline phosphatase level.   ·  Plan: Elevated alk phos to 238 on admission, trending down to 216 9/24. GGT found to be WNL. No active GI symptoms of CP.  RUQ US 9/22: 1. No acute sonographic findings; 5 mm non-obstructing right midpole intrarenal calculus.  Plan:  - Consider simethicone PRN  IMPROVING.    Anemia.   ·  Plan: Hgb 10.0 (unknown baseline) likely 2/2 chronic disease given ferritin 1475, TBIC 140, folate and B12 WNL. Has received Venofer and EPO outpatient and Mircera  Plan:  - Consider starting iron supplementation.      Patient was discharged to: Home with HPT (home/[facility]/acute rehab/hospice, etc, and with what services – home health PT/RN? Home O2?)    New medications: Nifedipine 30mg BID  Changes to old medications: none  Medications that were stopped: Amlodipine 2.5mg    Items to follow up as outpatient: Greg, Cardiology; Nephro, Arellano    Physical exam at the time of discharge:    General: Pt sitting at bedside comfortably, no acute distress  HEENT: NC/AT; PERRL, anicteric sclera; MMM  Neck: supple, no JVD presnt  Cardiovascular: +S1/S2, normal RRR, no murmurs gallops or rubs  Respiratory: CTA B/L; no W/R/R, no use of accessory muscles  Gastrointestinal: soft, NT/ND, no palpable masses or hepatosplenomegaly appreciated; no rebound or guarding, +BSx4  Extremities: WWP; no edema, clubbing or cyanosis  Vascular: 2+ radial, DP/PT pulses B/L  Neurological: AAOx3; no focal deficits  Psychiatric: pleasant mood and affect  Dermatologic: no appreciable wounds or damage to the skin

## 2021-09-25 ENCOUNTER — TRANSCRIPTION ENCOUNTER (OUTPATIENT)
Age: 66
End: 2021-09-25

## 2021-09-25 VITALS — TEMPERATURE: 98 F

## 2021-09-25 LAB
ALBUMIN SERPL ELPH-MCNC: 4.2 G/DL — SIGNIFICANT CHANGE UP (ref 3.3–5)
ALP SERPL-CCNC: 224 U/L — HIGH (ref 40–120)
ALT FLD-CCNC: 7 U/L — LOW (ref 10–45)
ANION GAP SERPL CALC-SCNC: 16 MMOL/L — SIGNIFICANT CHANGE UP (ref 5–17)
AST SERPL-CCNC: 11 U/L — SIGNIFICANT CHANGE UP (ref 10–40)
BILIRUB SERPL-MCNC: 0.3 MG/DL — SIGNIFICANT CHANGE UP (ref 0.2–1.2)
BUN SERPL-MCNC: 48 MG/DL — HIGH (ref 7–23)
CALCIUM SERPL-MCNC: 8.7 MG/DL — SIGNIFICANT CHANGE UP (ref 8.4–10.5)
CHLORIDE SERPL-SCNC: 95 MMOL/L — LOW (ref 96–108)
CO2 SERPL-SCNC: 23 MMOL/L — SIGNIFICANT CHANGE UP (ref 22–31)
CREAT SERPL-MCNC: 7.78 MG/DL — HIGH (ref 0.5–1.3)
GLUCOSE SERPL-MCNC: 122 MG/DL — HIGH (ref 70–99)
HCT VFR BLD CALC: 32 % — LOW (ref 34.5–45)
HGB BLD-MCNC: 10.3 G/DL — LOW (ref 11.5–15.5)
MAGNESIUM SERPL-MCNC: 2 MG/DL — SIGNIFICANT CHANGE UP (ref 1.6–2.6)
MCHC RBC-ENTMCNC: 29.7 PG — SIGNIFICANT CHANGE UP (ref 27–34)
MCHC RBC-ENTMCNC: 32.2 GM/DL — SIGNIFICANT CHANGE UP (ref 32–36)
MCV RBC AUTO: 92.2 FL — SIGNIFICANT CHANGE UP (ref 80–100)
NRBC # BLD: 0 /100 WBCS — SIGNIFICANT CHANGE UP (ref 0–0)
PHOSPHATE SERPL-MCNC: 4.8 MG/DL — HIGH (ref 2.5–4.5)
PLATELET # BLD AUTO: 218 K/UL — SIGNIFICANT CHANGE UP (ref 150–400)
POTASSIUM SERPL-MCNC: 4.6 MMOL/L — SIGNIFICANT CHANGE UP (ref 3.5–5.3)
POTASSIUM SERPL-SCNC: 4.6 MMOL/L — SIGNIFICANT CHANGE UP (ref 3.5–5.3)
PROT SERPL-MCNC: 7.7 G/DL — SIGNIFICANT CHANGE UP (ref 6–8.3)
RBC # BLD: 3.47 M/UL — LOW (ref 3.8–5.2)
RBC # FLD: 18.3 % — HIGH (ref 10.3–14.5)
SODIUM SERPL-SCNC: 134 MMOL/L — LOW (ref 135–145)
WBC # BLD: 7 K/UL — SIGNIFICANT CHANGE UP (ref 3.8–10.5)
WBC # FLD AUTO: 7 K/UL — SIGNIFICANT CHANGE UP (ref 3.8–10.5)

## 2021-09-25 PROCEDURE — 86803 HEPATITIS C AB TEST: CPT

## 2021-09-25 PROCEDURE — 93017 CV STRESS TEST TRACING ONLY: CPT

## 2021-09-25 PROCEDURE — 90662 IIV NO PRSV INCREASED AG IM: CPT

## 2021-09-25 PROCEDURE — 76705 ECHO EXAM OF ABDOMEN: CPT

## 2021-09-25 PROCEDURE — 86706 HEP B SURFACE ANTIBODY: CPT

## 2021-09-25 PROCEDURE — 83036 HEMOGLOBIN GLYCOSYLATED A1C: CPT

## 2021-09-25 PROCEDURE — 82550 ASSAY OF CK (CPK): CPT

## 2021-09-25 PROCEDURE — 36415 COLL VENOUS BLD VENIPUNCTURE: CPT

## 2021-09-25 PROCEDURE — 82746 ASSAY OF FOLIC ACID SERUM: CPT

## 2021-09-25 PROCEDURE — 83690 ASSAY OF LIPASE: CPT

## 2021-09-25 PROCEDURE — 86769 SARS-COV-2 COVID-19 ANTIBODY: CPT

## 2021-09-25 PROCEDURE — 99285 EMERGENCY DEPT VISIT HI MDM: CPT

## 2021-09-25 PROCEDURE — 80048 BASIC METABOLIC PNL TOTAL CA: CPT

## 2021-09-25 PROCEDURE — 99239 HOSP IP/OBS DSCHRG MGMT >30: CPT

## 2021-09-25 PROCEDURE — 85027 COMPLETE CBC AUTOMATED: CPT

## 2021-09-25 PROCEDURE — 83735 ASSAY OF MAGNESIUM: CPT

## 2021-09-25 PROCEDURE — 97116 GAIT TRAINING THERAPY: CPT

## 2021-09-25 PROCEDURE — A9500: CPT

## 2021-09-25 PROCEDURE — 87340 HEPATITIS B SURFACE AG IA: CPT

## 2021-09-25 PROCEDURE — 83880 ASSAY OF NATRIURETIC PEPTIDE: CPT

## 2021-09-25 PROCEDURE — 80053 COMPREHEN METABOLIC PANEL: CPT

## 2021-09-25 PROCEDURE — 83540 ASSAY OF IRON: CPT

## 2021-09-25 PROCEDURE — C1887: CPT

## 2021-09-25 PROCEDURE — 71046 X-RAY EXAM CHEST 2 VIEWS: CPT

## 2021-09-25 PROCEDURE — 80061 LIPID PANEL: CPT

## 2021-09-25 PROCEDURE — 84100 ASSAY OF PHOSPHORUS: CPT

## 2021-09-25 PROCEDURE — C1894: CPT

## 2021-09-25 PROCEDURE — 86901 BLOOD TYPING SEROLOGIC RH(D): CPT

## 2021-09-25 PROCEDURE — 86900 BLOOD TYPING SEROLOGIC ABO: CPT

## 2021-09-25 PROCEDURE — 82728 ASSAY OF FERRITIN: CPT

## 2021-09-25 PROCEDURE — C1769: CPT

## 2021-09-25 PROCEDURE — 78452 HT MUSCLE IMAGE SPECT MULT: CPT

## 2021-09-25 PROCEDURE — 85730 THROMBOPLASTIN TIME PARTIAL: CPT

## 2021-09-25 PROCEDURE — 85025 COMPLETE CBC W/AUTO DIFF WBC: CPT

## 2021-09-25 PROCEDURE — 93005 ELECTROCARDIOGRAM TRACING: CPT

## 2021-09-25 PROCEDURE — 90935 HEMODIALYSIS ONE EVALUATION: CPT

## 2021-09-25 PROCEDURE — A9505: CPT

## 2021-09-25 PROCEDURE — 84466 ASSAY OF TRANSFERRIN: CPT

## 2021-09-25 PROCEDURE — 82607 VITAMIN B-12: CPT

## 2021-09-25 PROCEDURE — 85610 PROTHROMBIN TIME: CPT

## 2021-09-25 PROCEDURE — 99232 SBSQ HOSP IP/OBS MODERATE 35: CPT | Mod: GC

## 2021-09-25 PROCEDURE — 86850 RBC ANTIBODY SCREEN: CPT

## 2021-09-25 PROCEDURE — 84484 ASSAY OF TROPONIN QUANT: CPT

## 2021-09-25 PROCEDURE — 87635 SARS-COV-2 COVID-19 AMP PRB: CPT

## 2021-09-25 PROCEDURE — 82553 CREATINE MB FRACTION: CPT

## 2021-09-25 PROCEDURE — 97162 PT EVAL MOD COMPLEX 30 MIN: CPT

## 2021-09-25 PROCEDURE — C8929: CPT

## 2021-09-25 PROCEDURE — 83550 IRON BINDING TEST: CPT

## 2021-09-25 PROCEDURE — 82977 ASSAY OF GGT: CPT

## 2021-09-25 RX ORDER — NIFEDIPINE 30 MG
1 TABLET, EXTENDED RELEASE 24 HR ORAL
Qty: 30 | Refills: 3
Start: 2021-09-25 | End: 2022-01-22

## 2021-09-25 RX ORDER — ASPIRIN/CALCIUM CARB/MAGNESIUM 324 MG
1 TABLET ORAL
Qty: 30 | Refills: 3
Start: 2021-09-25 | End: 2022-01-22

## 2021-09-25 RX ORDER — ERYTHROPOIETIN 10000 [IU]/ML
4000 INJECTION, SOLUTION INTRAVENOUS; SUBCUTANEOUS ONCE
Refills: 0 | Status: COMPLETED | OUTPATIENT
Start: 2021-09-25 | End: 2021-09-25

## 2021-09-25 RX ORDER — ATORVASTATIN CALCIUM 80 MG/1
1 TABLET, FILM COATED ORAL
Qty: 0 | Refills: 0 | DISCHARGE
Start: 2021-09-25

## 2021-09-25 RX ORDER — AMLODIPINE BESYLATE 2.5 MG/1
1 TABLET ORAL
Qty: 0 | Refills: 0 | DISCHARGE

## 2021-09-25 RX ORDER — NIFEDIPINE 30 MG
60 TABLET, EXTENDED RELEASE 24 HR ORAL DAILY
Refills: 0 | Status: DISCONTINUED | OUTPATIENT
Start: 2021-09-25 | End: 2021-09-25

## 2021-09-25 RX ADMIN — Medication 100 MILLIGRAM(S): at 05:51

## 2021-09-25 RX ADMIN — Medication 1 TABLET(S): at 13:59

## 2021-09-25 RX ADMIN — CARVEDILOL PHOSPHATE 25 MILLIGRAM(S): 80 CAPSULE, EXTENDED RELEASE ORAL at 13:58

## 2021-09-25 RX ADMIN — Medication 81 MILLIGRAM(S): at 13:59

## 2021-09-25 RX ADMIN — HEPARIN SODIUM 5000 UNIT(S): 5000 INJECTION INTRAVENOUS; SUBCUTANEOUS at 05:51

## 2021-09-25 RX ADMIN — SEVELAMER CARBONATE 800 MILLIGRAM(S): 2400 POWDER, FOR SUSPENSION ORAL at 13:58

## 2021-09-25 RX ADMIN — SEVELAMER CARBONATE 800 MILLIGRAM(S): 2400 POWDER, FOR SUSPENSION ORAL at 08:56

## 2021-09-25 RX ADMIN — ERYTHROPOIETIN 4000 UNIT(S): 10000 INJECTION, SOLUTION INTRAVENOUS; SUBCUTANEOUS at 12:47

## 2021-09-25 RX ADMIN — SEVELAMER CARBONATE 800 MILLIGRAM(S): 2400 POWDER, FOR SUSPENSION ORAL at 17:49

## 2021-09-25 RX ADMIN — CINACALCET 30 MILLIGRAM(S): 30 TABLET, FILM COATED ORAL at 13:59

## 2021-09-25 RX ADMIN — Medication 650 MILLIGRAM(S): at 08:45

## 2021-09-25 RX ADMIN — Medication 650 MILLIGRAM(S): at 07:46

## 2021-09-25 RX ADMIN — HEPARIN SODIUM 5000 UNIT(S): 5000 INJECTION INTRAVENOUS; SUBCUTANEOUS at 13:58

## 2021-09-25 RX ADMIN — Medication 60 MILLIGRAM(S): at 13:58

## 2021-09-25 NOTE — PROGRESS NOTE ADULT - SUBJECTIVE AND OBJECTIVE BOX
Patient is a 66y Female seen and evaluated at bedside during dialysis. Plan for dc today. Comfortable, NAD.     Meds:    acetaminophen   Tablet .. 650 every 6 hours PRN  allopurinol 100 every 24 hours  aspirin enteric coated 81 daily  atorvastatin 40 at bedtime  carvedilol 25 every 12 hours  cinacalcet 30 daily  diphenhydrAMINE   Injectable 50 once  heparin   Injectable 5000 every 8 hours  influenza  Vaccine (HIGH DOSE) 0.7 once  Nephro-margaret 1 daily  NIFEdipine XL 60 daily  sevelamer carbonate 800 three times a day with meals      T(C): , Max: 37 (09-25-21 @ 09:40)  T(F): , Max: 98.6 (09-25-21 @ 09:40)  HR: 71 (09-25-21 @ 13:40)  BP: 137/69 (09-25-21 @ 13:40)  BP(mean): 89 (09-25-21 @ 13:40)  RR: 19 (09-25-21 @ 13:40)  SpO2: 100% (09-25-21 @ 13:40)  Wt(kg): --    09-25 @ 07:01  -  09-25 @ 16:04  --------------------------------------------------------  IN: 870 mL / OUT: 3400 mL / NET: -2530 mL          Review of Systems:  RESPIRATORY: No shortness of breath  CARDIOVASCULAR: No chest pain   MUSCULOSKELETAL: No leg oedema    PHYSICAL EXAM:  GENERAL: Alert, awake, oriented x3  on RA  CHEST/LUNG: Bilateral clear breath sounds, no rales  HEART: Regular rate and rhythm, no murmur  EXTREMITIES: no pedal oedema  ACCESS: LUE AVF w/bruit and thrill     LABS:                        10.3   7.00  )-----------( 218      ( 25 Sep 2021 06:53 )             32.0     09-25    134<L>  |  95<L>  |  48<H>  ----------------------------<  122<H>  4.6   |  23  |  7.78<H>    Ca    8.7      25 Sep 2021 06:53  Phos  4.8     09-25  Mg     2.0     09-25    TPro  7.7  /  Alb  4.2  /  TBili  0.3  /  DBili  x   /  AST  11  /  ALT  7<L>  /  AlkPhos  224<H>  09-25      PT/INR - ( 24 Sep 2021 14:51 )   PT: 12.6 sec;   INR: 1.05          PTT - ( 24 Sep 2021 14:51 )  PTT:37.8 sec          RADIOLOGY & ADDITIONAL STUDIES:

## 2021-09-25 NOTE — PROGRESS NOTE ADULT - ASSESSMENT
65 yo F with past medical history of HTN, ESRD HD MWF, gout presents with chest pain currently undergoing cardiac work up.     ESRD on HD MWF @[address] dialysis  HD today followed by dc  EDW ?68kg  BP: hypertensive, UF with HD; meds on coreg 25mg BID, nifedipine 30mg BID  Anaemia- Hgb at goal, no need for IV iron, no epo with HD  BMD: phos at goal on renvela 800 TID    Patient was seen and evaluated on dialysis.   Dialyzer: Optiflux [id number]         QB: 400 mL/min         QD: 500 mL/min      K bath: 3  Goal UF: 3L                Duration: 240 min     Patient is tolerating the procedure well. Continue full hemodialysis treatment as prescribed.  Daily weights, strict I&Os, renally dose meds, renal diet

## 2021-09-25 NOTE — DISCHARGE NOTE NURSING/CASE MANAGEMENT/SOCIAL WORK - NSDCPEEMAIL_GEN_ALL_CORE
[facility] email [email or web address]

## 2021-09-25 NOTE — PROGRESS NOTE ADULT - ATTENDING COMMENTS
Dialyzed 2 days ago. HTN borderline controlled. A/P ESRD, HTN. HD today. Continue coreg, nifedipine.
I find admitted with CP at dialysis, now with SBP 180s. A/P: Dialysis today. UF as tolerated. Being monitored for recurrence of CP.
66Y F w/ PMHx HTN, ESRD on HD (MWF) and Gout presents w/ CP admitted for R/O ACS and poorly controlled HTN    -CP - r/o ACS - s/p + pharm NST - Plan for [facility] today; TTE shows normal LVEF 60-65%, significant LVH, no WMA, no significant valvular disease. Trop flat , CK/CKMB negative. C/w ASA 81mg and statin. Currently CP Free, HD/E stable  -HTN - elevated BP, c/w Coreg 25mg BID and change Amlodipine to Nifedipine 30 BID, can uptitrate Nifedipine   -ESRD - HD will be early tmrw AM, then would resume MWF as outpatient  -DASH diet  -DVTppx  -Dispo: Cardiac Tele; HD tmrw AM and likely d/c home pending cath results    Ivan Lynne MD  Cardiology Attending
66Y F w/ PMHx HTN, ESRD on HD (MWF) and Gout presents w/ CP admitted for R/O ACS and poorly controlled HTN.    -CP - Will go for pharm NST; TTE shows normal LVEF 60-65%, significant LVH, no WMA, no significant valvular disease. Trop flat , CK/CKMB negative. C/w ASA 81mg and statin.  -HTN - elevated BP, c/w Coreg 25mg BID and increase Amlodipine to 10mg QD. If BP not well controlled, will consider third agent.  -ESRD - next HD on Friday  -DASH diet  -DVTppx  -Dispo: Cardiac Tele    Ivan Lynne MD  Cardiology Attending

## 2021-09-25 NOTE — DISCHARGE NOTE NURSING/CASE MANAGEMENT/SOCIAL WORK - PATIENT PORTAL LINK FT
You can access the FollowMyHealth Patient Portal offered by [facility] by registering at the following website: [email or web address]. By joining [employer]’s FollowMyHealth portal, you will also be able to view your health information using other applications (apps) compatible with our system.

## 2021-09-25 NOTE — DISCHARGE NOTE NURSING/CASE MANAGEMENT/SOCIAL WORK - NSDCPEFALRISK_GEN_ALL_CORE
For information on Fall & injury Prevention, visit [email or web address]

## 2021-09-25 NOTE — DISCHARGE NOTE NURSING/CASE MANAGEMENT/SOCIAL WORK - NSDCPEWEB_GEN_ALL_CORE
[facility] for Tobacco Control website --- [email or web address] website --- [email or web address] [email or web address]

## 2021-09-30 DIAGNOSIS — F17.210 NICOTINE DEPENDENCE, CIGARETTES, UNCOMPLICATED: ICD-10-CM

## 2021-09-30 DIAGNOSIS — I44.0 ATRIOVENTRICULAR BLOCK, FIRST DEGREE: ICD-10-CM

## 2021-09-30 DIAGNOSIS — I25.10 ATHEROSCLEROTIC HEART DISEASE OF NATIVE CORONARY ARTERY WITHOUT ANGINA PECTORIS: ICD-10-CM

## 2021-09-30 DIAGNOSIS — M10.9 GOUT, UNSPECIFIED: ICD-10-CM

## 2021-09-30 DIAGNOSIS — Z20.822 CONTACT WITH AND (SUSPECTED) EXPOSURE TO COVID-19: ICD-10-CM

## 2021-09-30 DIAGNOSIS — Z99.2 DEPENDENCE ON RENAL DIALYSIS: ICD-10-CM

## 2021-09-30 DIAGNOSIS — Z91.041 RADIOGRAPHIC DYE ALLERGY STATUS: ICD-10-CM

## 2021-09-30 DIAGNOSIS — Z88.0 ALLERGY STATUS TO PENICILLIN: ICD-10-CM

## 2021-09-30 DIAGNOSIS — R07.9 CHEST PAIN, UNSPECIFIED: ICD-10-CM

## 2021-09-30 DIAGNOSIS — D63.1 ANEMIA IN CHRONIC KIDNEY DISEASE: ICD-10-CM

## 2021-09-30 DIAGNOSIS — R74.8 ABNORMAL LEVELS OF OTHER SERUM ENZYMES: ICD-10-CM

## 2021-09-30 DIAGNOSIS — N18.6 END STAGE RENAL DISEASE: ICD-10-CM

## 2021-09-30 DIAGNOSIS — I12.0 HYPERTENSIVE CHRONIC KIDNEY DISEASE WITH STAGE 5 CHRONIC KIDNEY DISEASE OR END STAGE RENAL DISEASE: ICD-10-CM

## 2022-01-24 ENCOUNTER — EMERGENCY (EMERGENCY)
Facility: HOSPITAL | Age: 67
LOS: 1 days | Discharge: HOME | End: 2022-01-24
Attending: EMERGENCY MEDICINE | Admitting: EMERGENCY MEDICINE
Payer: MEDICARE

## 2022-01-24 VITALS
OXYGEN SATURATION: 100 % | TEMPERATURE: 98 F | HEART RATE: 70 BPM | HEIGHT: 66 IN | WEIGHT: 189.6 LBS | RESPIRATION RATE: 17 BRPM | DIASTOLIC BLOOD PRESSURE: 75 MMHG | SYSTOLIC BLOOD PRESSURE: 184 MMHG

## 2022-01-24 VITALS
OXYGEN SATURATION: 100 % | SYSTOLIC BLOOD PRESSURE: 172 MMHG | DIASTOLIC BLOOD PRESSURE: 87 MMHG | TEMPERATURE: 98 F | HEART RATE: 69 BPM

## 2022-01-24 DIAGNOSIS — N18.6 END STAGE RENAL DISEASE: ICD-10-CM

## 2022-01-24 DIAGNOSIS — Z88.0 ALLERGY STATUS TO PENICILLIN: ICD-10-CM

## 2022-01-24 DIAGNOSIS — Z79.82 LONG TERM (CURRENT) USE OF ASPIRIN: ICD-10-CM

## 2022-01-24 DIAGNOSIS — I12.0 HYPERTENSIVE CHRONIC KIDNEY DISEASE WITH STAGE 5 CHRONIC KIDNEY DISEASE OR END STAGE RENAL DISEASE: ICD-10-CM

## 2022-01-24 DIAGNOSIS — Z88.8 ALLERGY STATUS TO OTHER DRUGS, MEDICAMENTS AND BIOLOGICAL SUBSTANCES STATUS: ICD-10-CM

## 2022-01-24 DIAGNOSIS — Z20.822 CONTACT WITH AND (SUSPECTED) EXPOSURE TO COVID-19: ICD-10-CM

## 2022-01-24 DIAGNOSIS — R00.1 BRADYCARDIA, UNSPECIFIED: ICD-10-CM

## 2022-01-24 DIAGNOSIS — Z91.041 RADIOGRAPHIC DYE ALLERGY STATUS: ICD-10-CM

## 2022-01-24 DIAGNOSIS — R53.1 WEAKNESS: ICD-10-CM

## 2022-01-24 DIAGNOSIS — Z99.2 DEPENDENCE ON RENAL DIALYSIS: ICD-10-CM

## 2022-01-24 LAB
ALBUMIN SERPL ELPH-MCNC: 4.1 G/DL — SIGNIFICANT CHANGE UP (ref 3.3–5)
ALP SERPL-CCNC: 207 U/L — HIGH (ref 40–120)
ALT FLD-CCNC: 7 U/L — LOW (ref 10–45)
ANION GAP SERPL CALC-SCNC: 14 MMOL/L — SIGNIFICANT CHANGE UP (ref 5–17)
AST SERPL-CCNC: 15 U/L — SIGNIFICANT CHANGE UP (ref 10–40)
BASOPHILS # BLD AUTO: 0.04 K/UL — SIGNIFICANT CHANGE UP (ref 0–0.2)
BASOPHILS NFR BLD AUTO: 0.9 % — SIGNIFICANT CHANGE UP (ref 0–2)
BILIRUB SERPL-MCNC: 0.4 MG/DL — SIGNIFICANT CHANGE UP (ref 0.2–1.2)
BUN SERPL-MCNC: 20 MG/DL — SIGNIFICANT CHANGE UP (ref 7–23)
CALCIUM SERPL-MCNC: 9.5 MG/DL — SIGNIFICANT CHANGE UP (ref 8.4–10.5)
CHLORIDE SERPL-SCNC: 103 MMOL/L — SIGNIFICANT CHANGE UP (ref 96–108)
CK SERPL-CCNC: 40 U/L — SIGNIFICANT CHANGE UP (ref 25–170)
CO2 SERPL-SCNC: 25 MMOL/L — SIGNIFICANT CHANGE UP (ref 22–31)
CREAT SERPL-MCNC: 4.6 MG/DL — HIGH (ref 0.5–1.3)
EOSINOPHIL # BLD AUTO: 0.14 K/UL — SIGNIFICANT CHANGE UP (ref 0–0.5)
EOSINOPHIL NFR BLD AUTO: 3.3 % — SIGNIFICANT CHANGE UP (ref 0–6)
GLUCOSE SERPL-MCNC: 77 MG/DL — SIGNIFICANT CHANGE UP (ref 70–99)
HCT VFR BLD CALC: 36.1 % — SIGNIFICANT CHANGE UP (ref 34.5–45)
HGB BLD-MCNC: 11.5 G/DL — SIGNIFICANT CHANGE UP (ref 11.5–15.5)
IMM GRANULOCYTES NFR BLD AUTO: 0.2 % — SIGNIFICANT CHANGE UP (ref 0–1.5)
LYMPHOCYTES # BLD AUTO: 1.07 K/UL — SIGNIFICANT CHANGE UP (ref 1–3.3)
LYMPHOCYTES # BLD AUTO: 25.4 % — SIGNIFICANT CHANGE UP (ref 13–44)
MCHC RBC-ENTMCNC: 30.3 PG — SIGNIFICANT CHANGE UP (ref 27–34)
MCHC RBC-ENTMCNC: 31.9 GM/DL — LOW (ref 32–36)
MCV RBC AUTO: 95 FL — SIGNIFICANT CHANGE UP (ref 80–100)
MONOCYTES # BLD AUTO: 0.42 K/UL — SIGNIFICANT CHANGE UP (ref 0–0.9)
MONOCYTES NFR BLD AUTO: 10 % — SIGNIFICANT CHANGE UP (ref 2–14)
NEUTROPHILS # BLD AUTO: 2.54 K/UL — SIGNIFICANT CHANGE UP (ref 1.8–7.4)
NEUTROPHILS NFR BLD AUTO: 60.2 % — SIGNIFICANT CHANGE UP (ref 43–77)
NRBC # BLD: 0 /100 WBCS — SIGNIFICANT CHANGE UP (ref 0–0)
PLATELET # BLD AUTO: 159 K/UL — SIGNIFICANT CHANGE UP (ref 150–400)
POTASSIUM SERPL-MCNC: 3.8 MMOL/L — SIGNIFICANT CHANGE UP (ref 3.5–5.3)
POTASSIUM SERPL-SCNC: 3.8 MMOL/L — SIGNIFICANT CHANGE UP (ref 3.5–5.3)
PROT SERPL-MCNC: 7 G/DL — SIGNIFICANT CHANGE UP (ref 6–8.3)
RBC # BLD: 3.8 M/UL — SIGNIFICANT CHANGE UP (ref 3.8–5.2)
RBC # FLD: 18.5 % — HIGH (ref 10.3–14.5)
SARS-COV-2 RNA SPEC QL NAA+PROBE: NEGATIVE — SIGNIFICANT CHANGE UP
SODIUM SERPL-SCNC: 142 MMOL/L — SIGNIFICANT CHANGE UP (ref 135–145)
TROPONIN T SERPL-MCNC: 0.06 NG/ML — CRITICAL HIGH (ref 0–0.01)
WBC # BLD: 4.22 K/UL — SIGNIFICANT CHANGE UP (ref 3.8–10.5)
WBC # FLD AUTO: 4.22 K/UL — SIGNIFICANT CHANGE UP (ref 3.8–10.5)

## 2022-01-24 PROCEDURE — 80053 COMPREHEN METABOLIC PANEL: CPT

## 2022-01-24 PROCEDURE — 99285 EMERGENCY DEPT VISIT HI MDM: CPT

## 2022-01-24 PROCEDURE — 85025 COMPLETE CBC W/AUTO DIFF WBC: CPT

## 2022-01-24 PROCEDURE — 87635 SARS-COV-2 COVID-19 AMP PRB: CPT

## 2022-01-24 PROCEDURE — 36415 COLL VENOUS BLD VENIPUNCTURE: CPT

## 2022-01-24 PROCEDURE — 84484 ASSAY OF TROPONIN QUANT: CPT

## 2022-01-24 PROCEDURE — 99283 EMERGENCY DEPT VISIT LOW MDM: CPT

## 2022-01-24 PROCEDURE — 82550 ASSAY OF CK (CPK): CPT

## 2022-01-24 PROCEDURE — 93005 ELECTROCARDIOGRAM TRACING: CPT

## 2022-01-24 PROCEDURE — 93010 ELECTROCARDIOGRAM REPORT: CPT

## 2022-01-24 NOTE — ED PROVIDER NOTE - OBJECTIVE STATEMENT
66 F sent for eval of low hr- hr noted to be in the 40's- after HD- sent for eval- px denies cp/sob/dizziness/lightheadedness- felt very mild weakness at the time  feels well now- has had low hr in the past- takes carvedilol 25 bid and nifedipine 60 ER for htn- no ho mi/cva  mod severity- no sx now- she thinks they removed 3 L of fluid  no sig exac/allev factors

## 2022-01-24 NOTE — ED PROVIDER NOTE - PATIENT PORTAL LINK FT
You can access the FollowMyHealth Patient Portal offered by [facility] by registering at the following website: [email or web address]. By joining [employer]’s FollowMyHealth portal, you will also be able to view your health information using other applications (apps) compatible with our system.

## 2022-01-24 NOTE — ED PROVIDER NOTE - CLINICAL SUMMARY MEDICAL DECISION MAKING FREE TEXT BOX
asx mild bradycardia- px on several rate control agents- will check lytes- but doubt px needs sig marie now- if labs neg - ok to fu with outpx cards

## 2022-01-24 NOTE — ED PROVIDER NOTE - NSFOLLOWUPINSTRUCTIONS_ED_ALL_ED_FT
BRADYCARDIA - AfterCare(R) Instructions(ER/ED)           Bradycardia    WHAT YOU NEED TO KNOW:    Bradycardia is a slow heart rate, usually fewer than 60 beats per minute. A slow heart rate is normal for some people, such as athletes, and needs no treatment. Bradycardia may also be caused by health conditions that do need treatment. Your healthcare provider will tell you what heart rate is too low for you.    Heart Chambers         DISCHARGE INSTRUCTIONS:    Call your local emergency number (911 in the [place]) or have someone call if:   •You have new or worsening dizziness, shortness of breath, chest pain, or confusion.          Call your doctor or cardiologist if:   •You feel lightheaded or faint.      •Your pulse rate is lower than healthcare providers say it should be, even with treatment.      •You are more tired than usual, even with treatment.      •You have questions or concerns about your condition or care.      Medicines:   •Medicines may be given to increase your heart rate and help your symptoms. You may also need medicine to treat a condition that is causing your symptoms.      •Take your medicine as directed. Contact your healthcare provider if you think your medicine is not helping or if you have side effects. Tell him or her if you are allergic to any medicine. Keep a list of the medicines, vitamins, and herbs you take. Include the amounts, and when and why you take them. Bring the list or the pill bottles to follow-up visits. Carry your medicine list with you in case of an emergency.      Heart monitoring at home: You may need to use a heart monitor at home to provide more information about your condition. This device is also called a Holter monitor, event monitor, or mobile telemetry. Bring your monitor with you to follow-up visits with your healthcare provider or cardiologist. Ask for more information about the Holter monitor.     Holter Monitor         Manage or prevent bradycardia:   •Talk to your healthcare provider about all your current medicines. He or she may change a medicine if it is causing your slow heart rate. Do not stop taking any medicine unless directed by your provider.      •Keep a record of your symptoms. Include when you have bradycardia, and what you were doing when it started. Also include anything that made your symptoms better or worse. Bring the record to follow-up visits with your healthcare providers.      •Do not smoke. Nicotine and other chemicals in cigarettes and cigars can cause heart and lung damage. Ask your healthcare provider for information if you currently smoke and need help to quit. E-cigarettes or smokeless tobacco still contain nicotine. Talk to your healthcare provider before you use these products.      •Reach or maintain a healthy weight. Your healthcare provider can tell you what a healthy weight is for you. He or she can help you create a weight loss plan if needed. Weight loss can help strengthen your heartbeat. If you have sleep apnea, weight loss may help you breathe more regularly while you sleep.      •Exercise as directed. Exercise can help strengthen your heart. It can also help you manage your weight and improve your sleep. Your healthcare provider can help you create an exercise plan. He or she may recommend 30 to 40 minutes of exercise most days of the week.   Family Walking for Exercise           •Eat a variety of healthy foods. Healthy foods include fruits, vegetables, whole-grain breads and cereals, low-fat dairy products, lean meats, fish, and cooked beans. Depending on the cause of your bradycardia, your healthcare provider may recommend a heart healthy diet. This diet is low in sodium (salt) and unhealthy fats. A dietitian or your healthcare provider can help you create a heart healthy diet.              Follow up with your doctor or cardiologist as directed: Write down your questions so you remember to ask them during your visits. You may need to see specialists for more treatment. If you have a pacemaker, your cardiologist needs to make sure that it is working as it should.    For more information:   •American [employer] Association  [address] [place] [id number]   Phone: 1-472.517.2185  Web Address: http://www.heart.org            © Copyright [employer] 2022           back to top                          © Copyright [employer] 2022

## 2022-01-24 NOTE — ED ADULT NURSE NOTE - OBJECTIVE STATEMENT
pt present to Ed for bradycardia during dialysis. pt states while in dialysis, the nurse noted a decrease in heart rate in the low 40s and was sent here. pt denies any dizziness or CP or SOB duirng assessment. repeat vitals shows HR of 69. EKG done arrival. pt is A&Ox4, ambulates on wheelchair or walker at home, denies any pain or chest discomfort or any SOB. lung sounds clear upon auscultation.

## 2022-01-24 NOTE — ED ADULT TRIAGE NOTE - CHIEF COMPLAINT QUOTE
pt BIBA from HD Center after finishing treatment today. as per EMS "pt had a HR of 40" pt denies cp,sob. dizziness.

## 2022-01-24 NOTE — ED ADULT NURSE NOTE - NSIMPLEMENTINTERV_GEN_ALL_ED
Implemented All Fall with Harm Risk Interventions:  [place] to call system. Call bell, personal items and telephone within reach. Instruct patient to call for assistance. Room bathroom lighting operational. Non-slip footwear when patient is off stretcher. Physically safe environment: no spills, clutter or unnecessary equipment. Stretcher in lowest position, wheels locked, appropriate side rails in place. Provide visual cue, wrist band, yellow gown, etc. Monitor gait and stability. Monitor for mental status changes and reorient to person, place, and time. Review medications for side effects contributing to fall risk. Reinforce activity limits and safety measures with patient and family. Provide visual clues: red socks.

## 2022-01-24 NOTE — ED ADULT TRIAGE NOTE - HEIGHT IN INCHES
Julisa Rodriguez DO, saw and evaluated the patient  I have discussed the patient with the resident/non-physician practitioner and agree with the resident's/non-physician practitioner's findings, Plan of Care, and MDM as documented in the resident's/non-physician practitioner's note, except where noted  All available labs and Radiology studies were reviewed  I was present for key portions of any procedure(s) performed by the resident/non-physician practitioner and I was immediately available to provide assistance  At this point I agree with the current assessment done in the Emergency Department  I have conducted an independent evaluation of this patient a history and physical is as follows:      80 yo F with 2 weeks of SOB/chest tightness  Worsened this afternoon  Worse with exertion and sitting up, improves laying down  Reports it was much worse at home before EMS arrived, now improved, but not resolved  Never had similar  Some nausea without vomiting  Reports slight cough  Denies fevers, chills, abdominal pain, changes in stool  Lives at home with son  Daughter at bedside providing translation  No known CAD, but multiple risk factors       GEN: Vitals stable, no acute distress, appears comfortable  HEENT: Head is normocephalic/atraumatic, EOMI, PERRL  CV: heart regular rate and rhythm, no murmurs/rubs/gallops, no chest wall ttp  PULM: Lungs CTA b/l, no wheezes/rales/rhonchi, no cough present  ABD: soft, nontender, nondistended  BACK: no ttp, no rash/skin changes  NEURO: GCS 15, no focal neuro deficits, strength 5/5 all extremities, sensation grossly intact  EXT: skin warm/dry, no peripheral edema  PSYCH: normal affect/personal interaction    A/P: 80 yo F with CP/SOB, ACS r/o, CXR to r/o PNA/CHF, admit    Critical Care Time  Procedures
6

## 2022-01-25 PROBLEM — I10 ESSENTIAL (PRIMARY) HYPERTENSION: Chronic | Status: ACTIVE | Noted: 2021-09-23

## 2022-01-25 PROBLEM — M10.9 GOUT, UNSPECIFIED: Chronic | Status: ACTIVE | Noted: 2021-09-23

## 2022-01-25 PROBLEM — N18.6 END STAGE RENAL DISEASE: Chronic | Status: ACTIVE | Noted: 2021-09-23

## 2022-02-03 ENCOUNTER — NON-APPOINTMENT (OUTPATIENT)
Age: 67
End: 2022-02-03

## 2022-02-03 ENCOUNTER — APPOINTMENT (OUTPATIENT)
Dept: HEART AND VASCULAR | Facility: CLINIC | Age: 67
End: 2022-02-03

## 2022-02-03 ENCOUNTER — APPOINTMENT (OUTPATIENT)
Dept: HEART AND VASCULAR | Facility: CLINIC | Age: 67
End: 2022-02-03
Payer: MEDICARE

## 2022-02-03 VITALS
TEMPERATURE: 96.9 F | HEIGHT: 66 IN | HEART RATE: 76 BPM | SYSTOLIC BLOOD PRESSURE: 150 MMHG | OXYGEN SATURATION: 98 % | BODY MASS INDEX: 23.94 KG/M2 | DIASTOLIC BLOOD PRESSURE: 82 MMHG | WEIGHT: 148.99 LBS

## 2022-02-03 DIAGNOSIS — F17.210 NICOTINE DEPENDENCE, CIGARETTES, UNCOMPLICATED: ICD-10-CM

## 2022-02-03 DIAGNOSIS — R00.1 BRADYCARDIA, UNSPECIFIED: ICD-10-CM

## 2022-02-03 DIAGNOSIS — Z78.9 OTHER SPECIFIED HEALTH STATUS: ICD-10-CM

## 2022-02-03 DIAGNOSIS — I73.9 PERIPHERAL VASCULAR DISEASE, UNSPECIFIED: ICD-10-CM

## 2022-02-03 PROCEDURE — 99214 OFFICE O/P EST MOD 30 MIN: CPT

## 2022-02-03 PROCEDURE — 93000 ELECTROCARDIOGRAM COMPLETE: CPT

## 2022-02-03 RX ORDER — CARVEDILOL 25 MG/1
25 TABLET, FILM COATED ORAL
Qty: 360 | Refills: 3 | Status: ACTIVE | COMMUNITY

## 2022-02-03 RX ORDER — SEVELAMER HYDROCHLORIDE 800 MG/1
800 TABLET, FILM COATED ORAL 3 TIMES DAILY
Refills: 0 | Status: ACTIVE | COMMUNITY

## 2022-02-03 RX ORDER — NIFEDIPINE 60 MG
60 TABLET, EXTENDED RELEASE ORAL DAILY
Refills: 0 | Status: ACTIVE | COMMUNITY

## 2022-02-03 RX ORDER — ASPIRIN 81 MG/1
81 TABLET ORAL DAILY
Qty: 90 | Refills: 1 | Status: ACTIVE | COMMUNITY

## 2022-02-03 RX ORDER — FOLIC ACID/VIT B COMPLEX AND C 0.8 MG
TABLET ORAL
Refills: 0 | Status: ACTIVE | COMMUNITY

## 2022-02-04 NOTE — HISTORY OF PRESENT ILLNESS
[FreeTextEntry1] : 66 F ESRD on HD HTN GOUT PAD \par \par \par referred by Dr Arellano noted to be bradycardic during HD went to ED and her heart rate was normal she is now essentially wheelchair bound has bilateral calf pain when she does walk with her cane in her apartment no dizziness no syncope no presyncope\par \par ecg nsr \par \par cardiac cath 9/2021 Non obs CAD calcified iliac arteries

## 2022-02-04 NOTE — ASSESSMENT
[FreeTextEntry1] : PAD she has poor peripheral pulses WIll do RODRICK/PVR and US\par Start atorvastatin 40 mg daily\par Bradycardia holter during dialysis and echo\par fu after testing

## 2022-02-17 ENCOUNTER — APPOINTMENT (OUTPATIENT)
Dept: HEART AND VASCULAR | Facility: CLINIC | Age: 67
End: 2022-02-17

## 2022-02-22 ENCOUNTER — APPOINTMENT (OUTPATIENT)
Dept: HEART AND VASCULAR | Facility: CLINIC | Age: 67
End: 2022-02-22

## 2022-02-24 ENCOUNTER — APPOINTMENT (OUTPATIENT)
Dept: HEART AND VASCULAR | Facility: CLINIC | Age: 67
End: 2022-02-24

## 2022-03-03 ENCOUNTER — APPOINTMENT (OUTPATIENT)
Dept: HEART AND VASCULAR | Facility: CLINIC | Age: 67
End: 2022-03-03

## 2022-03-10 ENCOUNTER — APPOINTMENT (OUTPATIENT)
Dept: HEART AND VASCULAR | Facility: CLINIC | Age: 67
End: 2022-03-10

## 2022-03-17 ENCOUNTER — APPOINTMENT (OUTPATIENT)
Dept: HEART AND VASCULAR | Facility: CLINIC | Age: 67
End: 2022-03-17

## 2022-05-26 ENCOUNTER — APPOINTMENT (OUTPATIENT)
Dept: HEART AND VASCULAR | Facility: CLINIC | Age: 67
End: 2022-05-26
Payer: MEDICARE

## 2022-05-26 VITALS
DIASTOLIC BLOOD PRESSURE: 65 MMHG | WEIGHT: 148 LBS | OXYGEN SATURATION: 99 % | SYSTOLIC BLOOD PRESSURE: 134 MMHG | HEART RATE: 67 BPM | HEIGHT: 66 IN | BODY MASS INDEX: 23.78 KG/M2 | TEMPERATURE: 97.4 F

## 2022-05-26 DIAGNOSIS — I49.3 VENTRICULAR PREMATURE DEPOLARIZATION: ICD-10-CM

## 2022-05-26 PROCEDURE — 99214 OFFICE O/P EST MOD 30 MIN: CPT

## 2022-05-26 PROCEDURE — 93306 TTE W/DOPPLER COMPLETE: CPT

## 2022-05-26 RX ORDER — ALLOPURINOL 100 MG/1
100 TABLET ORAL DAILY
Qty: 90 | Refills: 0 | Status: DISCONTINUED | COMMUNITY
End: 2022-05-26

## 2022-05-27 NOTE — ASSESSMENT
[FreeTextEntry1] : PAD she has poor peripheral pulses declines herlinda/pvr and peripheral US\par on atorvastatin 40 mg daily \par holter with very frequent monomorphic pvc on coreg she has no symptoms echo with normal EF \par if EF preserved no intervention repeat monitor to confirm\par fu in three months

## 2022-05-27 NOTE — HISTORY OF PRESENT ILLNESS
[FreeTextEntry1] : 67 F ESRD on HD HTN GOUT PAD wheel chair bound active smoker \par \par \par here for fu she has not wanted to get her legs evaluated with herlinda/pvr she has no complaints today holter done with 10% PVC\par \par ecg nsr \par \par cardiac cath 9/2021 Non obs CAD calcified iliac arteries

## 2023-02-19 ENCOUNTER — RX RENEWAL (OUTPATIENT)
Age: 68
End: 2023-02-19

## 2023-02-19 RX ORDER — ATORVASTATIN CALCIUM 40 MG/1
40 TABLET, FILM COATED ORAL
Qty: 90 | Refills: 3 | Status: ACTIVE | COMMUNITY
Start: 2022-02-04 | End: 1900-01-01

## 2023-03-07 ENCOUNTER — APPOINTMENT (OUTPATIENT)
Dept: VASCULAR SURGERY | Facility: CLINIC | Age: 68
End: 2023-03-07
Payer: MEDICARE

## 2023-03-07 ENCOUNTER — OUTPATIENT (OUTPATIENT)
Dept: OUTPATIENT SERVICES | Facility: HOSPITAL | Age: 68
LOS: 1 days | End: 2023-03-07
Payer: MEDICARE

## 2023-03-07 DIAGNOSIS — Z01.818 ENCOUNTER FOR OTHER PREPROCEDURAL EXAMINATION: ICD-10-CM

## 2023-03-07 DIAGNOSIS — I70.229 ATHEROSCLEROSIS OF NATIVE ARTERIES OF EXTREMITIES WITH REST PAIN, UNSPECIFIED EXTREMITY: ICD-10-CM

## 2023-03-07 LAB
ALBUMIN SERPL ELPH-MCNC: 4.3 G/DL — SIGNIFICANT CHANGE UP (ref 3.3–5)
ALP SERPL-CCNC: 252 U/L — HIGH (ref 40–120)
ALT FLD-CCNC: 6 U/L — LOW (ref 10–45)
ANION GAP SERPL CALC-SCNC: 16 MMOL/L — SIGNIFICANT CHANGE UP (ref 5–17)
APTT BLD: 31.7 SEC — SIGNIFICANT CHANGE UP (ref 27.5–35.5)
AST SERPL-CCNC: 10 U/L — SIGNIFICANT CHANGE UP (ref 10–40)
BASOPHILS # BLD AUTO: 0.03 K/UL — SIGNIFICANT CHANGE UP (ref 0–0.2)
BASOPHILS NFR BLD AUTO: 0.5 % — SIGNIFICANT CHANGE UP (ref 0–2)
BILIRUB SERPL-MCNC: 0.4 MG/DL — SIGNIFICANT CHANGE UP (ref 0.2–1.2)
BUN SERPL-MCNC: 30 MG/DL — HIGH (ref 7–23)
CALCIUM SERPL-MCNC: 10.3 MG/DL — SIGNIFICANT CHANGE UP (ref 8.4–10.5)
CHLORIDE SERPL-SCNC: 98 MMOL/L — SIGNIFICANT CHANGE UP (ref 96–108)
CO2 SERPL-SCNC: 26 MMOL/L — SIGNIFICANT CHANGE UP (ref 22–31)
CREAT SERPL-MCNC: 6.55 MG/DL — HIGH (ref 0.5–1.3)
EGFR: 6 ML/MIN/1.73M2 — LOW
EOSINOPHIL # BLD AUTO: 0.08 K/UL — SIGNIFICANT CHANGE UP (ref 0–0.5)
EOSINOPHIL NFR BLD AUTO: 1.4 % — SIGNIFICANT CHANGE UP (ref 0–6)
GLUCOSE SERPL-MCNC: 90 MG/DL — SIGNIFICANT CHANGE UP (ref 70–99)
HCT VFR BLD CALC: 28.1 % — LOW (ref 34.5–45)
HGB BLD-MCNC: 8.8 G/DL — LOW (ref 11.5–15.5)
IMM GRANULOCYTES NFR BLD AUTO: 0.2 % — SIGNIFICANT CHANGE UP (ref 0–0.9)
INR BLD: 1.02 — SIGNIFICANT CHANGE UP (ref 0.88–1.16)
LYMPHOCYTES # BLD AUTO: 0.95 K/UL — LOW (ref 1–3.3)
LYMPHOCYTES # BLD AUTO: 16.9 % — SIGNIFICANT CHANGE UP (ref 13–44)
MCHC RBC-ENTMCNC: 29.8 PG — SIGNIFICANT CHANGE UP (ref 27–34)
MCHC RBC-ENTMCNC: 31.3 GM/DL — LOW (ref 32–36)
MCV RBC AUTO: 95.3 FL — SIGNIFICANT CHANGE UP (ref 80–100)
MONOCYTES # BLD AUTO: 0.65 K/UL — SIGNIFICANT CHANGE UP (ref 0–0.9)
MONOCYTES NFR BLD AUTO: 11.6 % — SIGNIFICANT CHANGE UP (ref 2–14)
NEUTROPHILS # BLD AUTO: 3.9 K/UL — SIGNIFICANT CHANGE UP (ref 1.8–7.4)
NEUTROPHILS NFR BLD AUTO: 69.4 % — SIGNIFICANT CHANGE UP (ref 43–77)
NRBC # BLD: 0 /100 WBCS — SIGNIFICANT CHANGE UP (ref 0–0)
PLATELET # BLD AUTO: 210 K/UL — SIGNIFICANT CHANGE UP (ref 150–400)
POTASSIUM SERPL-MCNC: 4.3 MMOL/L — SIGNIFICANT CHANGE UP (ref 3.5–5.3)
POTASSIUM SERPL-SCNC: 4.3 MMOL/L — SIGNIFICANT CHANGE UP (ref 3.5–5.3)
PROT SERPL-MCNC: 7.3 G/DL — SIGNIFICANT CHANGE UP (ref 6–8.3)
PROTHROM AB SERPL-ACNC: 12.1 SEC — SIGNIFICANT CHANGE UP (ref 10.5–13.4)
RBC # BLD: 2.95 M/UL — LOW (ref 3.8–5.2)
RBC # FLD: 17.6 % — HIGH (ref 10.3–14.5)
SODIUM SERPL-SCNC: 140 MMOL/L — SIGNIFICANT CHANGE UP (ref 135–145)
WBC # BLD: 5.62 K/UL — SIGNIFICANT CHANGE UP (ref 3.8–10.5)
WBC # FLD AUTO: 5.62 K/UL — SIGNIFICANT CHANGE UP (ref 3.8–10.5)

## 2023-03-07 PROCEDURE — 80053 COMPREHEN METABOLIC PANEL: CPT

## 2023-03-07 PROCEDURE — 93926 LOWER EXTREMITY STUDY: CPT

## 2023-03-07 PROCEDURE — 93010 ELECTROCARDIOGRAM REPORT: CPT

## 2023-03-07 PROCEDURE — 99204 OFFICE O/P NEW MOD 45 MIN: CPT

## 2023-03-07 PROCEDURE — 85025 COMPLETE CBC W/AUTO DIFF WBC: CPT

## 2023-03-07 PROCEDURE — 93005 ELECTROCARDIOGRAM TRACING: CPT

## 2023-03-07 PROCEDURE — 85610 PROTHROMBIN TIME: CPT

## 2023-03-07 PROCEDURE — 85730 THROMBOPLASTIN TIME PARTIAL: CPT

## 2023-03-14 NOTE — PHYSICAL EXAM
[Normal Thyroid] : the thyroid was normal [Normal Breath Sounds] : Normal breath sounds [Respiratory Effort] : normal respiratory effort [Normal Heart Sounds] : normal heart sounds [Normal Rate and Rhythm] : normal rate and rhythm [2+] : left 2+ [0] : right 0 [1+] : left 1+ [No HSM] : no hepatosplenomegaly [Alert] : alert [Calm] : calm [JVD] : no jugular venous distention  [Carotid Bruits] : no carotid bruits [Right Carotid Bruit] : no bruit heard over the right carotid [Left Carotid Bruit] : no bruit heard over the left carotid [Ankle Swelling (On Exam)] : not present [Varicose Veins Of Lower Extremities] : not present [] : not present [Abdomen Masses] : No abdominal masses [Abdomen Tenderness] : ~T ~M No abdominal tenderness [Purpura] : no purpura  [Petechiae] : no petechiae [Skin Ulcer] : no ulcer [Skin Induration] : no induration [de-identified] : Well-nourished, NAD [de-identified] : NC/AT,anicteric [de-identified] : FROM throughout, strength 5/5x4 [de-identified] : Hyperpigmented R toes, nontender, no open wounds or ulcers [de-identified] : Neurosensory impaired to light touch in R foot/toes

## 2023-03-14 NOTE — PROCEDURE
[FreeTextEntry1] : Arterial duplex performed to evaluate LE circulation reveals highly calcified RLE arteries w/complete occlusion of the R SFA, single vessel runoff to the foot via PTA

## 2023-03-14 NOTE — ASSESSMENT
[FreeTextEntry1] : 67yoF w/PMHx of HTN, chronic smoking hx (smoking 3cigs/d from 1/2ppd previously), ESRD on HD (qMWF at [facility] on [address] referred by Dr. Marcelo Arellano for 1mo h/o tight, discolored R toes w/tingling and pain.  Pt states that the onset was gradual, but has now progressed to including pain in the R foot/toes at night that forces her to hang the leg down at times.  Pt is unable to walk long distances, often stopping every 2-3mins to rest.\par \par Hyperpigmented toes noted in the R foot/toes, no open ulcers noted.  Arterial duplex performed to evaluate LE circulation reveals highly calcified RLE arteries w/complete occlusion of the R SFA, single vessel runoff to the foot via PTA.  Explained to pt that patency may be limited if the SFA were to be reopened, but offered the pt revascularization of the R SFA via catheter-based therapy in the cath lab in a few weeks.  Will schedule for 03/23 in the cath lab at [facility].

## 2023-03-14 NOTE — ADDENDUM
[FreeTextEntry1] : This note was written by Onesimo Boyd, acting as a scribe for Dr. Nicholas Johnson.  I, Dr. Nicholas Johnson, have read and attest that all the information, medical decision-making, and discharge instructions within are true and accurate.\par \par I, Dr. Nicholas Johnson, personally performed the evaluation and management (E/M) services for this new patient.  That E/M includes conducting the initial examination, assessing all conditions, and establishing the plan of care.  Today, my ACP, Onesimo Boyd, was here to observe my evaluation and management services for this patient to be followed going forward.

## 2023-03-14 NOTE — HISTORY OF PRESENT ILLNESS
[FreeTextEntry1] : 67yoF w/PMHx of HTN, chronic smoking hx (smoking 3cigs/d from 1/2ppd previously), ESRD on HD (qMWF at [facility] on [address] referred by Dr. Marcelo Arellano for 1mo h/o tight, discolored R toes w/tingling and pain.  Pt states that the onset was gradual, but has now progressed to including pain in the R foot/toes at night that forces her to hang the leg down at times.  Pt is unable to walk long distances, often stopping every 2-3mins to rest.\par \par Pt denies any ulcers or wounds in the legs/feet/toes.  She is currently hemodialyzed qMWF w/o issue.

## 2023-03-20 DIAGNOSIS — Z91.041 RADIOGRAPHIC DYE ALLERGY STATUS: ICD-10-CM

## 2023-03-20 RX ORDER — PREDNISONE 50 MG/1
50 TABLET ORAL
Qty: 3 | Refills: 0 | Status: ACTIVE | COMMUNITY
Start: 2023-03-20 | End: 1900-01-01

## 2023-03-20 RX ORDER — DIPHENHYDRAMINE HCL 25 MG/1
25 CAPSULE ORAL
Qty: 2 | Refills: 0 | Status: ACTIVE | COMMUNITY
Start: 2023-03-20 | End: 1900-01-01

## 2023-03-23 ENCOUNTER — TRANSCRIPTION ENCOUNTER (OUTPATIENT)
Age: 68
End: 2023-03-23

## 2023-03-24 ENCOUNTER — OUTPATIENT (OUTPATIENT)
Dept: OUTPATIENT SERVICES | Facility: HOSPITAL | Age: 68
LOS: 1 days | Discharge: HOME | End: 2023-03-24
Payer: MEDICARE

## 2023-03-24 VITALS
HEIGHT: 66 IN | WEIGHT: 134.92 LBS | DIASTOLIC BLOOD PRESSURE: 91 MMHG | OXYGEN SATURATION: 95 % | RESPIRATION RATE: 18 BRPM | HEART RATE: 84 BPM | SYSTOLIC BLOOD PRESSURE: 202 MMHG

## 2023-03-24 LAB
ANION GAP SERPL CALC-SCNC: 17 MMOL/L — SIGNIFICANT CHANGE UP (ref 5–17)
BASOPHILS # BLD AUTO: 0.04 K/UL — SIGNIFICANT CHANGE UP (ref 0–0.2)
BASOPHILS NFR BLD AUTO: 0.5 % — SIGNIFICANT CHANGE UP (ref 0–2)
BUN SERPL-MCNC: 35 MG/DL — HIGH (ref 7–23)
CALCIUM SERPL-MCNC: 9.9 MG/DL — SIGNIFICANT CHANGE UP (ref 8.4–10.5)
CHLORIDE SERPL-SCNC: 97 MMOL/L — SIGNIFICANT CHANGE UP (ref 96–108)
CO2 SERPL-SCNC: 26 MMOL/L — SIGNIFICANT CHANGE UP (ref 22–31)
CREAT SERPL-MCNC: 6.93 MG/DL — HIGH (ref 0.5–1.3)
EGFR: 6 ML/MIN/1.73M2 — LOW
EOSINOPHIL # BLD AUTO: 0.18 K/UL — SIGNIFICANT CHANGE UP (ref 0–0.5)
EOSINOPHIL NFR BLD AUTO: 2.5 % — SIGNIFICANT CHANGE UP (ref 0–6)
GLUCOSE SERPL-MCNC: 88 MG/DL — SIGNIFICANT CHANGE UP (ref 70–99)
HCT VFR BLD CALC: 29.4 % — LOW (ref 34.5–45)
HGB BLD-MCNC: 9.2 G/DL — LOW (ref 11.5–15.5)
IMM GRANULOCYTES NFR BLD AUTO: 0.5 % — SIGNIFICANT CHANGE UP (ref 0–0.9)
LYMPHOCYTES # BLD AUTO: 1.2 K/UL — SIGNIFICANT CHANGE UP (ref 1–3.3)
LYMPHOCYTES # BLD AUTO: 16.5 % — SIGNIFICANT CHANGE UP (ref 13–44)
MCHC RBC-ENTMCNC: 29.9 PG — SIGNIFICANT CHANGE UP (ref 27–34)
MCHC RBC-ENTMCNC: 31.3 GM/DL — LOW (ref 32–36)
MCV RBC AUTO: 95.5 FL — SIGNIFICANT CHANGE UP (ref 80–100)
MONOCYTES # BLD AUTO: 0.71 K/UL — SIGNIFICANT CHANGE UP (ref 0–0.9)
MONOCYTES NFR BLD AUTO: 9.8 % — SIGNIFICANT CHANGE UP (ref 2–14)
NEUTROPHILS # BLD AUTO: 5.11 K/UL — SIGNIFICANT CHANGE UP (ref 1.8–7.4)
NEUTROPHILS NFR BLD AUTO: 70.2 % — SIGNIFICANT CHANGE UP (ref 43–77)
NRBC # BLD: 0 /100 WBCS — SIGNIFICANT CHANGE UP (ref 0–0)
PLATELET # BLD AUTO: 251 K/UL — SIGNIFICANT CHANGE UP (ref 150–400)
POTASSIUM SERPL-MCNC: 3.7 MMOL/L — SIGNIFICANT CHANGE UP (ref 3.5–5.3)
POTASSIUM SERPL-SCNC: 3.7 MMOL/L — SIGNIFICANT CHANGE UP (ref 3.5–5.3)
RBC # BLD: 3.08 M/UL — LOW (ref 3.8–5.2)
RBC # FLD: 16.9 % — HIGH (ref 10.3–14.5)
SODIUM SERPL-SCNC: 140 MMOL/L — SIGNIFICANT CHANGE UP (ref 135–145)
WBC # BLD: 7.28 K/UL — SIGNIFICANT CHANGE UP (ref 3.8–10.5)
WBC # FLD AUTO: 7.28 K/UL — SIGNIFICANT CHANGE UP (ref 3.8–10.5)

## 2023-03-24 PROCEDURE — 37226: CPT | Mod: RT

## 2023-03-24 PROCEDURE — 80048 BASIC METABOLIC PNL TOTAL CA: CPT

## 2023-03-24 PROCEDURE — 76000 FLUOROSCOPY <1 HR PHYS/QHP: CPT

## 2023-03-24 PROCEDURE — C1887: CPT

## 2023-03-24 PROCEDURE — 36140 INTRO NDL ICATH UPR/LXTR ART: CPT | Mod: GC

## 2023-03-24 PROCEDURE — C1769: CPT

## 2023-03-24 PROCEDURE — 36415 COLL VENOUS BLD VENIPUNCTURE: CPT

## 2023-03-24 PROCEDURE — 75710 ARTERY X-RAYS ARM/LEG: CPT | Mod: 26,GC

## 2023-03-24 PROCEDURE — 36247 INS CATH ABD/L-EXT ART 3RD: CPT | Mod: 26,GC

## 2023-03-24 PROCEDURE — 76937 US GUIDE VASCULAR ACCESS: CPT | Mod: 26,GC

## 2023-03-24 PROCEDURE — C1894: CPT

## 2023-03-24 PROCEDURE — 85025 COMPLETE CBC W/AUTO DIFF WBC: CPT

## 2023-03-24 PROCEDURE — 75625 CONTRAST EXAM ABDOMINL AORTA: CPT | Mod: 26,GC

## 2023-03-24 RX ORDER — CHLORHEXIDINE GLUCONATE 213 G/1000ML
1 SOLUTION TOPICAL ONCE
Refills: 0 | Status: DISCONTINUED | OUTPATIENT
Start: 2023-03-24 | End: 2023-04-07

## 2023-03-24 RX ORDER — CARVEDILOL PHOSPHATE 80 MG/1
1 CAPSULE, EXTENDED RELEASE ORAL
Qty: 0 | Refills: 0 | DISCHARGE

## 2023-03-24 RX ORDER — HYDROCORTISONE 20 MG
100 TABLET ORAL ONCE
Refills: 0 | Status: COMPLETED | OUTPATIENT
Start: 2023-03-24 | End: 2023-03-24

## 2023-03-24 RX ORDER — ALLOPURINOL 300 MG
1 TABLET ORAL
Qty: 0 | Refills: 0 | DISCHARGE

## 2023-03-24 RX ORDER — CARVEDILOL PHOSPHATE 80 MG/1
1 CAPSULE, EXTENDED RELEASE ORAL
Qty: 60 | Refills: 0
Start: 2023-03-24 | End: 2023-04-22

## 2023-03-24 RX ORDER — NIFEDIPINE 30 MG
60 TABLET, EXTENDED RELEASE 24 HR ORAL ONCE
Refills: 0 | Status: COMPLETED | OUTPATIENT
Start: 2023-03-24 | End: 2023-03-24

## 2023-03-24 RX ORDER — HYDRALAZINE HCL 50 MG
10 TABLET ORAL ONCE
Refills: 0 | Status: COMPLETED | OUTPATIENT
Start: 2023-03-24 | End: 2023-03-24

## 2023-03-24 RX ORDER — LABETALOL HCL 100 MG
5 TABLET ORAL ONCE
Refills: 0 | Status: COMPLETED | OUTPATIENT
Start: 2023-03-24 | End: 2023-03-24

## 2023-03-24 RX ADMIN — Medication 10 MILLIGRAM(S): at 09:08

## 2023-03-24 RX ADMIN — Medication 60 MILLIGRAM(S): at 13:45

## 2023-03-24 RX ADMIN — Medication 5 MILLIGRAM(S): at 13:15

## 2023-03-24 RX ADMIN — Medication 100 MILLIGRAM(S): at 09:08

## 2023-03-24 NOTE — PRE-ANESTHESIA EVALUATION ADULT - NSANTHPEFT_GEN_ALL_CORE
General: Appearance is consistent with chronological age. No abnormal facies.  EENT: Anicteric sclera; oropharynx clear, moist mucus membranes  Cardiovascular:  Rate and rhythm evaluated  Respiratory: Unlabored breathing  Neurological: Awake and alert, moves all extremities  Constitutional: poor secondary to claudicaton

## 2023-03-24 NOTE — PROGRESS NOTE ADULT - SUBJECTIVE AND OBJECTIVE BOX
POST-OPERATIVE NOTE    Procedure: LE diagnostic angiogram     Diagnosis/Indication: rest pain     Surgeon: Dr. Johnson     S: Pt has no complaints. Denies CP, SOB, N/V. Pain controlled with medication.    O:  T(C): --  T(F): --  HR: --  BP: --  RR: --  SpO2: --  Wt(kg): --                        9.2    7.28  )-----------( 251      ( 24 Mar 2023 08:03 )             29.4     03-24    140  |  97  |  35<H>  ----------------------------<  88  3.7   |  26  |  6.93<H>    Ca    9.9      24 Mar 2023 08:03        Gen: NAD, resting comfortably in bed  C/V: NSR  Pulm: Nonlabored breathing, no respiratory distress  Abd: soft, NT/ND  Extrem: R DP/PT biphasic; L DP/PT biphasic, groin dressing c/d/i, soft, no hematoma       A/P: 67yFemale s/p above procedure  -flat x 4 hours  -dispo: home   -f/u outpatient with Dr. Johnson for possible femoral endarterectomy

## 2023-03-24 NOTE — PRE-ANESTHESIA EVALUATION ADULT - NSANTHPMHFT_GEN_ALL_CORE
ESRD MWF (HD Wednesday)  atherosclerotic PVD  PVC  bradycardia  claudication  HLD    lumbar surgery - fusion  rt hip replacement  ovarian cyst

## 2023-04-05 DIAGNOSIS — Z91.041 RADIOGRAPHIC DYE ALLERGY STATUS: ICD-10-CM

## 2023-04-05 DIAGNOSIS — Z96.641 PRESENCE OF RIGHT ARTIFICIAL HIP JOINT: ICD-10-CM

## 2023-04-05 DIAGNOSIS — N18.6 END STAGE RENAL DISEASE: ICD-10-CM

## 2023-04-05 DIAGNOSIS — Z79.82 LONG TERM (CURRENT) USE OF ASPIRIN: ICD-10-CM

## 2023-04-05 DIAGNOSIS — I12.0 HYPERTENSIVE CHRONIC KIDNEY DISEASE WITH STAGE 5 CHRONIC KIDNEY DISEASE OR END STAGE RENAL DISEASE: ICD-10-CM

## 2023-04-05 DIAGNOSIS — E78.5 HYPERLIPIDEMIA, UNSPECIFIED: ICD-10-CM

## 2023-04-05 DIAGNOSIS — Z88.0 ALLERGY STATUS TO PENICILLIN: ICD-10-CM

## 2023-04-05 DIAGNOSIS — Z99.2 DEPENDENCE ON RENAL DIALYSIS: ICD-10-CM

## 2023-04-05 DIAGNOSIS — I49.3 VENTRICULAR PREMATURE DEPOLARIZATION: ICD-10-CM

## 2023-04-05 DIAGNOSIS — M10.9 GOUT, UNSPECIFIED: ICD-10-CM

## 2023-04-05 DIAGNOSIS — I70.261 ATHEROSCLEROSIS OF NATIVE ARTERIES OF EXTREMITIES WITH GANGRENE, RIGHT LEG: ICD-10-CM

## 2023-04-05 DIAGNOSIS — Z88.8 ALLERGY STATUS TO OTHER DRUGS, MEDICAMENTS AND BIOLOGICAL SUBSTANCES STATUS: ICD-10-CM

## 2023-04-05 DIAGNOSIS — F17.210 NICOTINE DEPENDENCE, CIGARETTES, UNCOMPLICATED: ICD-10-CM

## 2023-04-20 ENCOUNTER — NON-APPOINTMENT (OUTPATIENT)
Age: 68
End: 2023-04-20

## 2023-05-02 ENCOUNTER — APPOINTMENT (OUTPATIENT)
Dept: VASCULAR SURGERY | Facility: CLINIC | Age: 68
End: 2023-05-02

## 2023-08-03 NOTE — DISCHARGE NOTE NURSING/CASE MANAGEMENT/SOCIAL WORK - NSDCVIVACCINE_GEN_ALL_CORE_FT
I concur with the Admission Order and I certify that services are provided in accordance with Section 42 CFR § 412.3
No Vaccines Administered.